# Patient Record
Sex: MALE | Race: BLACK OR AFRICAN AMERICAN | URBAN - METROPOLITAN AREA
[De-identification: names, ages, dates, MRNs, and addresses within clinical notes are randomized per-mention and may not be internally consistent; named-entity substitution may affect disease eponyms.]

---

## 2018-04-12 ENCOUNTER — HISTORICAL (OUTPATIENT)
Dept: INTERNAL MEDICINE | Facility: CLINIC | Age: 38
End: 2018-04-12

## 2018-10-02 ENCOUNTER — HISTORICAL (OUTPATIENT)
Dept: LAB | Facility: HOSPITAL | Age: 38
End: 2018-10-02

## 2019-02-22 ENCOUNTER — HISTORICAL (OUTPATIENT)
Dept: ENDOSCOPY | Facility: HOSPITAL | Age: 39
End: 2019-02-22

## 2021-08-27 ENCOUNTER — HISTORICAL (OUTPATIENT)
Dept: LAB | Facility: HOSPITAL | Age: 41
End: 2021-08-27

## 2022-02-23 ENCOUNTER — HISTORICAL (OUTPATIENT)
Dept: ADMINISTRATIVE | Facility: HOSPITAL | Age: 42
End: 2022-02-23

## 2022-02-27 ENCOUNTER — HISTORICAL (OUTPATIENT)
Dept: ADMINISTRATIVE | Facility: HOSPITAL | Age: 42
End: 2022-02-27

## 2022-04-09 ENCOUNTER — HISTORICAL (OUTPATIENT)
Dept: ADMINISTRATIVE | Facility: HOSPITAL | Age: 42
End: 2022-04-09

## 2022-04-19 ENCOUNTER — HISTORICAL (OUTPATIENT)
Dept: ADMINISTRATIVE | Facility: HOSPITAL | Age: 42
End: 2022-04-19

## 2022-04-26 VITALS
SYSTOLIC BLOOD PRESSURE: 139 MMHG | DIASTOLIC BLOOD PRESSURE: 89 MMHG | HEIGHT: 74 IN | BODY MASS INDEX: 40.43 KG/M2 | WEIGHT: 315 LBS

## 2022-04-30 NOTE — OP NOTE
* Final Report *    UHDS (Verified)  DISCHARGE DATE:      PREOPERATIVE DIAGNOSIS:  Positive fecal immunochemical test, rectal bleeding.    POSTOPERATIVE DIAGNOSIS:  Negative colonoscopy.    PROCEDURE:  Colonoscopy.    COMPLICATIONS:  None.    ESTIMATED BLOOD LOSS:  None.    FINDINGS:  Completely negative colonoscopy.  Re-scope when the patient turns 50.    PROCEDURE:  After the risks and benefits of the procedure were explained to the patient, informed consent was obtained.  He was taken to endoscopy on 02/22/2019, placed on the endoscopy table in the left lateral decubitus position.  Over the course of the procedure, he was administered propofol by Anesthesiology.  I performed a rectal examination which was negative.  I then inserted the colonoscope all the way to the base of the cecum as evidenced by the appendiceal orifice and the ileocecal valve.  I centered the scope and significantly retracted it looking around in a 360-degree fashion.  There were no mucosal abnormalities.  No masses.  No polyps.  No AVMs.  No diverticula.  No evidence of inflammatory bowel disease.  I retroflexed in the rectum.  He has very mild internal hemorrhoidal disease.  This may be where the bleeding was coming from.  The scope was removed.  He tolerated the procedure well and was taken the recovery room in stable condition.    RECOMMENDATIONS:  Re-scope at age 50 for screening purposes.        ______________________________  MD SHORTY Felder/VINICIO  DD:  02/22/2019  Time:  09:35AM  DT:  02/22/2019  Time:  10:18AM  Job #:  [id number]       Result type: Discharge Summary  Result date: February 22, 2019 10:18 CST  Result status: Auth (Verified)  Result title: UHDS  Performed by: Dougie Baldwin MD on February 22, 2019 9:35 CST  Verified by: Dougie Baldwin MD on February 25, 2019 17:08 CST  Encounter info: [id number], [facility], Day Surgery, 2/22/2019 - 2/22/2019  Contributor system: NUVETA    Doc  has been moved by HIM specialist to the correct doc type.

## 2022-09-02 ENCOUNTER — LAB VISIT (OUTPATIENT)
Dept: LAB | Facility: HOSPITAL | Age: 42
End: 2022-09-02
Attending: PSYCHIATRY & NEUROLOGY
Payer: MEDICAID

## 2022-09-02 DIAGNOSIS — F25.0 SCHIZOAFFECTIVE DISORDER, BIPOLAR TYPE: ICD-10-CM

## 2022-09-02 DIAGNOSIS — Z79.899 ENCOUNTER FOR LONG-TERM (CURRENT) USE OF OTHER MEDICATIONS: Primary | ICD-10-CM

## 2022-09-02 LAB
ALBUMIN SERPL-MCNC: 4.1 GM/DL (ref 3.5–5)
ALBUMIN/GLOB SERPL: 1.1 RATIO (ref 1.1–2)
ALP SERPL-CCNC: 90 UNIT/L (ref 40–150)
ALT SERPL-CCNC: 18 UNIT/L (ref 0–55)
AST SERPL-CCNC: 13 UNIT/L (ref 5–34)
BILIRUBIN DIRECT+TOT PNL SERPL-MCNC: 0.4 MG/DL
BUN SERPL-MCNC: 13.7 MG/DL (ref 8.9–20.6)
CALCIUM SERPL-MCNC: 9.1 MG/DL (ref 8.4–10.2)
CHLORIDE SERPL-SCNC: 109 MMOL/L (ref 98–107)
CHOLEST SERPL-MCNC: 154 MG/DL
CHOLEST/HDLC SERPL: 4 {RATIO} (ref 0–5)
CO2 SERPL-SCNC: 24 MMOL/L (ref 22–29)
CREAT SERPL-MCNC: 1.25 MG/DL (ref 0.73–1.18)
GFR SERPLBLD CREATININE-BSD FMLA CKD-EPI: >60 MLS/MIN/1.73/M2
GLOBULIN SER-MCNC: 3.7 GM/DL (ref 2.4–3.5)
GLUCOSE SERPL-MCNC: 90 MG/DL (ref 74–100)
HDLC SERPL-MCNC: 37 MG/DL (ref 35–60)
LDLC SERPL CALC-MCNC: 99 MG/DL (ref 50–140)
POTASSIUM SERPL-SCNC: 4.1 MMOL/L (ref 3.5–5.1)
PROT SERPL-MCNC: 7.8 GM/DL (ref 6.4–8.3)
SODIUM SERPL-SCNC: 140 MMOL/L (ref 136–145)
TRIGL SERPL-MCNC: 89 MG/DL (ref 34–140)
VLDLC SERPL CALC-MCNC: 18 MG/DL

## 2022-09-02 PROCEDURE — 80061 LIPID PANEL: CPT

## 2022-09-02 PROCEDURE — 80053 COMPREHEN METABOLIC PANEL: CPT

## 2022-09-02 PROCEDURE — 36415 COLL VENOUS BLD VENIPUNCTURE: CPT

## 2022-12-12 ENCOUNTER — HOSPITAL ENCOUNTER (EMERGENCY)
Facility: HOSPITAL | Age: 42
Discharge: HOME | End: 2022-12-12
Attending: INTERNAL MEDICINE
Payer: MEDICAID

## 2022-12-12 VITALS
TEMPERATURE: 99 F | HEART RATE: 87 BPM | WEIGHT: 315 LBS | OXYGEN SATURATION: 98 % | SYSTOLIC BLOOD PRESSURE: 198 MMHG | RESPIRATION RATE: 18 BRPM | DIASTOLIC BLOOD PRESSURE: 152 MMHG

## 2022-12-12 DIAGNOSIS — I10 HYPERTENSION, UNSPECIFIED TYPE: Primary | ICD-10-CM

## 2022-12-12 DIAGNOSIS — Z76.0 ENCOUNTER FOR MEDICATION REFILL: ICD-10-CM

## 2022-12-12 PROCEDURE — 99282 EMERGENCY DEPT VISIT SF MDM: CPT

## 2022-12-12 RX ORDER — AMLODIPINE BESYLATE 10 MG/1
10 TABLET ORAL DAILY
Qty: 30 TABLET | Refills: 1 | Status: SHIPPED | OUTPATIENT
Start: 2022-12-12 | End: 2023-06-06 | Stop reason: SDUPTHER

## 2022-12-12 NOTE — DISCHARGE INSTRUCTIONS
Follow up with [facility] as discussed to obtain a PCP.  Take medication as prescribed.  Return to the [facility] ED immediately for onset of chest pain, SOB, or fever.

## 2022-12-12 NOTE — ED PROVIDER NOTES
Encounter Date: 12/12/2022       History     Chief Complaint   Patient presents with    Hypertension     Pt reports HTN, sporadically taking BP meds, now out, needs refill.      Pt is a 42 y.o. male who presents to the [facility] ED requesting a refill of his blood pressure medication. Reports being low on medication for months and has only taken his medication sporadically so he doesn't run out. Denies headache, dizziness, blurred vision, chest pain, SOB, weakness, or abdominal pain. Pt did not bring his medication and is unable to verbalize the medication or dosing.     Review of patient's allergies indicates:  No Known Allergies  No past medical history on file.  No past surgical history on file.  No family history on file.     Review of Systems   Constitutional:  Negative for chills, diaphoresis, fatigue and fever.   HENT:  Negative for facial swelling, postnasal drip, rhinorrhea, sinus pressure, sinus pain, sore throat and trouble swallowing.    Respiratory:  Negative for cough, chest tightness, shortness of breath and wheezing.    Cardiovascular:  Negative for chest pain, palpitations and leg swelling.   Gastrointestinal:  Negative for abdominal pain, diarrhea, nausea and vomiting.   Genitourinary:  Negative for dysuria, flank pain, hematuria and urgency.   Musculoskeletal:  Negative for arthralgias, back pain and myalgias.   Skin:  Negative for color change and rash.   Neurological:  Negative for dizziness, syncope, weakness and headaches.   Hematological:  Does not bruise/bleed easily.   All other systems reviewed and are negative.    Physical Exam     Initial Vitals [12/12/22 1130]   BP Pulse Resp Temp SpO2   (!) 198/152 87 18 98.8 °F (37.1 °C) 98 %      MAP       --         Physical Exam    Nursing note and vitals reviewed.  Constitutional: Vital signs are normal. He appears well-developed and well-nourished.   HENT:   Head: Normocephalic.   Nose: Nose normal.   Mouth/Throat: Oropharynx is clear and moist.    Eyes: Conjunctivae and EOM are normal. Pupils are equal, round, and reactive to light.   Neck: Neck supple.   Normal range of motion.  Cardiovascular:  Normal rate, regular rhythm, normal heart sounds and intact distal pulses.           Pulmonary/Chest: Effort normal and breath sounds normal. No respiratory distress. He has no wheezes. He has no rhonchi. He has no rales. He exhibits no tenderness.   Abdominal: Abdomen is soft and flat. Bowel sounds are normal. There is no abdominal tenderness. There is no rebound, no guarding, no tenderness at McBurney's point and negative Lopez's sign.   Musculoskeletal:         General: Normal range of motion.      Cervical back: Normal range of motion and neck supple.     Neurological: He is alert and oriented to person, place, and time. He has normal strength.   Skin: Skin is warm and dry. Capillary refill takes less than 2 seconds.   Psychiatric: He has a normal mood and affect. His behavior is normal. Judgment and thought content normal.       ED Course   Procedures  Labs Reviewed - No data to display       Imaging Results    None          Medications - No data to display  Medical Decision Making:   Differential Diagnosis:   HTN  Noncompliant with medication regimen  ED Management:  12:02 PM Reassessed patient at this time. Reports condition has improved. Discussed with patient all pertinent ED information and results. Discussed diagnosis and treatment plan with patient. Follow up instructions and return to ED instruction have been given. All questions and concerns were addressed at this time. Patient voices understanding of information and instructions. Patient is comfortable with plan and discharge. Patient is stable for discharge.                           Clinical Impression:   Final diagnoses:  [I10] Hypertension, unspecified type (Primary)  [Z76.0] Encounter for medication refill        ED Disposition Condition    Discharge Stable          ED Prescriptions        Medication Sig Dispense Start Date End Date Auth. Provider    amLODIPine (NORVASC) 10 MG tablet Take 1 tablet (10 mg total) by mouth once daily. 30 tablet 12/12/2022 12/12/2023 Rivera Real Jr., BOUBACAR          Follow-up Information       Follow up With Specialties Details Why Contact Info    [facility] Emergency Medicine In 3 days As needed, If symptoms worsen [address] [place] [id number] [phone]    [facility]  Schedule an appointment as soon as possible for a visit in 1 week Follow up with [facility] to obtain a PCP [address] [place] [phone]             Rivera Real Jr., BOUBACAR  12/12/22 9100

## 2023-06-06 ENCOUNTER — HOSPITAL ENCOUNTER (EMERGENCY)
Facility: HOSPITAL | Age: 43
Discharge: HOME | End: 2023-06-06
Attending: EMERGENCY MEDICINE
Payer: MEDICAID

## 2023-06-06 VITALS
TEMPERATURE: 100 F | DIASTOLIC BLOOD PRESSURE: 117 MMHG | BODY MASS INDEX: 42.66 KG/M2 | RESPIRATION RATE: 16 BRPM | OXYGEN SATURATION: 96 % | WEIGHT: 315 LBS | HEART RATE: 69 BPM | SYSTOLIC BLOOD PRESSURE: 164 MMHG | HEIGHT: 72 IN

## 2023-06-06 DIAGNOSIS — I10 HYPERTENSION: Primary | ICD-10-CM

## 2023-06-06 DIAGNOSIS — Z91.148 NONCOMPLIANCE WITH MEDICATION REGIMEN: ICD-10-CM

## 2023-06-06 DIAGNOSIS — Z76.0 ENCOUNTER FOR MEDICATION REFILL: ICD-10-CM

## 2023-06-06 LAB
ALBUMIN SERPL-MCNC: 4 G/DL (ref 3.5–5)
ALBUMIN/GLOB SERPL: 1.2 RATIO (ref 1.1–2)
ALP SERPL-CCNC: 97 UNIT/L (ref 40–150)
ALT SERPL-CCNC: 14 UNIT/L (ref 0–55)
APPEARANCE UR: CLEAR
AST SERPL-CCNC: 11 UNIT/L (ref 5–34)
BACTERIA #/AREA URNS AUTO: ABNORMAL /HPF
BASOPHILS # BLD AUTO: 0.03 X10(3)/MCL
BASOPHILS NFR BLD AUTO: 0.9 %
BILIRUB UR QL STRIP.AUTO: NEGATIVE MG/DL
BILIRUBIN DIRECT+TOT PNL SERPL-MCNC: 0.4 MG/DL
BUN SERPL-MCNC: 21.1 MG/DL (ref 8.9–20.6)
CALCIUM SERPL-MCNC: 9.1 MG/DL (ref 8.4–10.2)
CHLORIDE SERPL-SCNC: 112 MMOL/L (ref 98–107)
CO2 SERPL-SCNC: 22 MMOL/L (ref 22–29)
COLOR UR: YELLOW
CREAT SERPL-MCNC: 1.59 MG/DL (ref 0.73–1.18)
EOSINOPHIL # BLD AUTO: 0.04 X10(3)/MCL (ref 0–0.9)
EOSINOPHIL NFR BLD AUTO: 1.2 %
ERYTHROCYTE [DISTWIDTH] IN BLOOD BY AUTOMATED COUNT: 17.6 % (ref 11.5–17)
GFR SERPLBLD CREATININE-BSD FMLA CKD-EPI: 55 MLS/MIN/1.73/M2
GLOBULIN SER-MCNC: 3.4 GM/DL (ref 2.4–3.5)
GLUCOSE SERPL-MCNC: 93 MG/DL (ref 74–100)
GLUCOSE UR QL STRIP.AUTO: NORMAL MG/DL
HCT VFR BLD AUTO: 39 % (ref 42–52)
HGB BLD-MCNC: 11.9 G/DL (ref 14–18)
HYALINE CASTS #/AREA URNS LPF: ABNORMAL /LPF
IMM GRANULOCYTES # BLD AUTO: 0.01 X10(3)/MCL (ref 0–0.04)
IMM GRANULOCYTES NFR BLD AUTO: 0.3 %
KETONES UR QL STRIP.AUTO: ABNORMAL MG/DL
LEUKOCYTE ESTERASE UR QL STRIP.AUTO: NEGATIVE UNIT/L
LYMPHOCYTES # BLD AUTO: 1.15 X10(3)/MCL (ref 0.6–4.6)
LYMPHOCYTES NFR BLD AUTO: 33.4 %
MCH RBC QN AUTO: 21.6 PG (ref 27–31)
MCHC RBC AUTO-ENTMCNC: 30.5 G/DL (ref 33–36)
MCV RBC AUTO: 70.7 FL (ref 80–94)
MONOCYTES # BLD AUTO: 0.43 X10(3)/MCL (ref 0.1–1.3)
MONOCYTES NFR BLD AUTO: 12.5 %
MUCOUS THREADS URNS QL MICRO: ABNORMAL /LPF
NEUTROPHILS # BLD AUTO: 1.78 X10(3)/MCL (ref 2.1–9.2)
NEUTROPHILS NFR BLD AUTO: 51.7 %
NITRITE UR QL STRIP.AUTO: NEGATIVE
NRBC BLD AUTO-RTO: 0 %
PH UR STRIP.AUTO: 6 [PH]
PLATELET # BLD AUTO: 185 X10(3)/MCL (ref 130–400)
PLATELETS.RETICULATED NFR BLD AUTO: 9.2 % (ref 0.9–11.2)
PMV BLD AUTO: 0 FL (ref 7.4–10.4)
POTASSIUM SERPL-SCNC: 4.3 MMOL/L (ref 3.5–5.1)
PROT SERPL-MCNC: 7.4 GM/DL (ref 6.4–8.3)
PROT UR QL STRIP.AUTO: ABNORMAL MG/DL
RBC # BLD AUTO: 5.52 X10(6)/MCL (ref 4.7–6.1)
RBC #/AREA URNS AUTO: ABNORMAL /HPF
RBC UR QL AUTO: NEGATIVE UNIT/L
SODIUM SERPL-SCNC: 141 MMOL/L (ref 136–145)
SP GR UR STRIP.AUTO: 1.03
SQUAMOUS #/AREA URNS LPF: ABNORMAL /HPF
TROPONIN I SERPL-MCNC: 0.03 NG/ML (ref 0–0.04)
UROBILINOGEN UR STRIP-ACNC: ABNORMAL MG/DL
WBC # SPEC AUTO: 3.44 X10(3)/MCL (ref 4.5–11.5)
WBC #/AREA URNS AUTO: ABNORMAL /HPF

## 2023-06-06 PROCEDURE — 84484 ASSAY OF TROPONIN QUANT: CPT | Performed by: NURSE PRACTITIONER

## 2023-06-06 PROCEDURE — 85025 COMPLETE CBC W/AUTO DIFF WBC: CPT | Performed by: NURSE PRACTITIONER

## 2023-06-06 PROCEDURE — 80053 COMPREHEN METABOLIC PANEL: CPT | Performed by: NURSE PRACTITIONER

## 2023-06-06 PROCEDURE — 99285 EMERGENCY DEPT VISIT HI MDM: CPT | Mod: 25

## 2023-06-06 PROCEDURE — 81001 URINALYSIS AUTO W/SCOPE: CPT | Performed by: NURSE PRACTITIONER

## 2023-06-06 PROCEDURE — 93005 ELECTROCARDIOGRAM TRACING: CPT

## 2023-06-06 PROCEDURE — 25000003 PHARM REV CODE 250: Performed by: NURSE PRACTITIONER

## 2023-06-06 RX ORDER — LISINOPRIL 10 MG/1
10 TABLET ORAL DAILY
Qty: 30 TABLET | Refills: 0 | Status: SHIPPED | OUTPATIENT
Start: 2023-06-06 | End: 2023-07-19 | Stop reason: SDUPTHER

## 2023-06-06 RX ORDER — LISINOPRIL 10 MG/1
10 TABLET ORAL
Status: COMPLETED | OUTPATIENT
Start: 2023-06-06 | End: 2023-06-06

## 2023-06-06 RX ORDER — CLONIDINE HYDROCHLORIDE 0.1 MG/1
0.1 TABLET ORAL
Status: COMPLETED | OUTPATIENT
Start: 2023-06-06 | End: 2023-06-06

## 2023-06-06 RX ORDER — AMLODIPINE BESYLATE 5 MG/1
5 TABLET ORAL
Status: COMPLETED | OUTPATIENT
Start: 2023-06-06 | End: 2023-06-06

## 2023-06-06 RX ORDER — AMLODIPINE BESYLATE 10 MG/1
10 TABLET ORAL DAILY
Qty: 30 TABLET | Refills: 0 | Status: SHIPPED | OUTPATIENT
Start: 2023-06-06 | End: 2023-07-19 | Stop reason: SDUPTHER

## 2023-06-06 RX ADMIN — CLONIDINE HYDROCHLORIDE 0.1 MG: 0.1 TABLET ORAL at 09:06

## 2023-06-06 RX ADMIN — AMLODIPINE BESYLATE 5 MG: 5 TABLET ORAL at 09:06

## 2023-06-06 RX ADMIN — LISINOPRIL 10 MG: 10 TABLET ORAL at 09:06

## 2023-06-06 NOTE — ED PROVIDER NOTES
Encounter Date: 6/6/2023       History     Chief Complaint   Patient presents with    Medication Refill    Hypertension     PT REQUESTING MED REFILL FOR HTN.  OUT FOR WKS.  NO PCP. /123.  ASYMPTOMATIC.      Patient requests refill of blood pressure medication. He has been out for several months. He denies any chest pain or shortness of breath. He denies any other complaints at this time. His risk factors include obesity and hypertension.    Review of patient's allergies indicates:  No Known Allergies  Past Medical History:   Diagnosis Date    Hypertension      History reviewed. No pertinent surgical history.  History reviewed. No pertinent family history.  Social History     Tobacco Use    Smoking status: Some Days     Types: Cigarettes    Smokeless tobacco: Never   Substance Use Topics    Drug use: Not Currently     Review of Systems   Constitutional:  Negative for fever.   HENT:  Negative for sore throat.    Respiratory:  Negative for shortness of breath.    Cardiovascular:  Negative for chest pain.   Gastrointestinal:  Negative for nausea.   Genitourinary:  Negative for dysuria.   Musculoskeletal:  Negative for back pain.   Skin:  Negative for rash.   Neurological:  Negative for weakness.   Hematological:  Does not bruise/bleed easily.   All other systems reviewed and are negative.    Physical Exam     Initial Vitals [06/06/23 0946]   BP Pulse Resp Temp SpO2   (!) 180/123 82 16 99.5 °F (37.5 °C) 96 %      MAP       --         Physical Exam    Nursing note and vitals reviewed.  Constitutional: He appears well-developed and well-nourished.   HENT:   Head: Normocephalic and atraumatic.   Neck: Neck supple.   Normal range of motion.  Cardiovascular:  Normal rate, regular rhythm, normal heart sounds and intact distal pulses.           Pulmonary/Chest: Breath sounds normal.   Abdominal: Abdomen is soft. Bowel sounds are normal.   Musculoskeletal:         General: Normal range of motion.      Cervical back: Normal  range of motion and neck supple.     Neurological: He is alert and oriented to person, place, and time. He has normal strength.   Skin: Skin is warm and dry.   Psychiatric: He has a normal mood and affect.       ED Course   Procedures  Labs Reviewed   COMPREHENSIVE METABOLIC PANEL - Abnormal; Notable for the following components:       Result Value    Chloride 112 (*)     Blood Urea Nitrogen 21.1 (*)     Creatinine 1.59 (*)     All other components within normal limits   URINALYSIS, REFLEX TO URINE CULTURE - Abnormal; Notable for the following components:    Protein, UA 1+ (*)     Ketones, UA Trace (*)     Urobilinogen, UA 1+ (*)     Squamous Epithelial Cells, UA Trace (*)     Mucous, UA Trace (*)     Hyaline Casts, UA 0-2 (*)     All other components within normal limits   CBC WITH DIFFERENTIAL - Abnormal; Notable for the following components:    WBC 3.44 (*)     Hgb 11.9 (*)     Hct 39.0 (*)     MCV 70.7 (*)     MCH 21.6 (*)     MCHC 30.5 (*)     RDW 17.6 (*)     MPV 0.0 (*)     Neut # 1.78 (*)     All other components within normal limits   TROPONIN I - Normal   CBC W/ AUTO DIFFERENTIAL    Narrative:     The following orders were created for panel order CBC auto differential.  Procedure                               Abnormality         Status                     ---------                               -----------         ------                     CBC with Differential[[id number]]        Abnormal            Final result                 Please view results for these tests on the individual orders.     EKG Readings: (Independently Interpreted)   Initial Reading: No STEMI. Rhythm: Normal Sinus Rhythm. Heart Rate: 77. Ectopy: No Ectopy. Conduction: Normal. Axis: Left Axis Deviation. Other Impression: nonspecific st abnormality   ECG Results              EKG 12-lead (In process)  Result time 06/06/23 10:45:15      In process by Interface, [facility] (06/06/23 10:45:15)                   Narrative:    Test Reason :  I10,    Vent. Rate : 077 BPM     Atrial Rate : 077 BPM     P-R Int : 152 ms          QRS Dur : 088 ms      QT Int : 386 ms       P-R-T Axes : 045 -44 014 degrees     QTc Int : 436 ms    Normal sinus rhythm  Left axis deviation  Junctional ST depression, probably abnormal  Abnormal ECG  No previous ECGs available    Referred By: [email or web address]   SELF           Confirmed By:                                   Imaging Results              X-Ray Chest AP Portable (Final result)  Result time 06/06/23 10:36:10      Final result by Rivera Garrett MD (06/06/23 10:36:10)                   Impression:      No acute findings.      Electronically signed by: Rivera Garrett  Date:    06/06/2023  Time:    10:36               Narrative:    EXAMINATION:  XR CHEST AP PORTABLE    CLINICAL HISTORY:  Essential (primary) hypertension    COMPARISON:  No priors    FINDINGS:  Portable frontal view of the chest was obtained. The heart is not enlarged.  Lungs are clear.  There is no pneumothorax or significant effusion.                                       Medications   cloNIDine tablet 0.1 mg (0.1 mg Oral Given 6/6/23 0957)   amLODIPine tablet 5 mg (5 mg Oral Given 6/6/23 0957)   lisinopriL tablet 10 mg (10 mg Oral Given 6/6/23 0957)     Medical Decision Making:   History:   Old Records Summarized: records from clinic visits and records from previous admission(s).  Initial Assessment:   Patient requests refill of blood pressure medication. He has been out for several months. He denies any chest pain or shortness of breath. He denies any other complaints at this time. His risk factors include obesity and hypertension.    Clinical Tests:   Lab Tests: Ordered and Reviewed  Radiological Study: Ordered and Reviewed  Re-eval at 1150: no complaints while in the ER, will add lisinopril to currently prescribed amlodipine, he was advised to take all medications as prescribed,  he will follow up with his pcp, strict return to ER instructions given    11:56  AM DISPOSITION: The patient is resting comfortably in no acute distress.  He is hemodynamically stable and is without objective evidence for acute process requiring urgent intervention or hospitalization. I provided counseling to patient with regard to condition, the treatment plan, specific conditions for return, and the importance of follow up. Detailed written and verbal instructions provided to patient and he expressed a verbal understanding of the discharge instructions and overall management plan. Reiterated the importance of medication administration and safety and advised patient to follow up with primary care provider in 3-5 days or sooner if needed.  Answered questions at this time. The patient is stable for discharge.                           Clinical Impression:   Final diagnoses:  [I10] Hypertension (Primary)  [Z76.0] Encounter for medication refill  [Z91.148] Noncompliance with medication regimen        ED Disposition Condition    Discharge Stable          ED Prescriptions       Medication Sig Dispense Start Date End Date Auth. Provider    amLODIPine (NORVASC) 10 MG tablet Take 1 tablet (10 mg total) by mouth once daily. 30 tablet 6/6/2023 7/6/2023 YOLA Harvey    lisinopriL 10 MG tablet Take 1 tablet (10 mg total) by mouth once daily. 30 tablet 6/6/2023 7/6/2023 YOLA Harvey          Follow-up Information       Follow up With Specialties Details Why Contact Info    Christian Lagunas MD Psychiatry In 3 days  [address] [facility] [address]  [phone]      [facility] Emergency Medicine  If symptoms worsen [address] [facility] [id number] [phone]             YOLA Harvey  06/06/23 9595

## 2023-07-19 ENCOUNTER — OFFICE VISIT (OUTPATIENT)
Dept: INTERNAL MEDICINE | Facility: CLINIC | Age: 43
End: 2023-07-19
Payer: MEDICAID

## 2023-07-19 VITALS
HEART RATE: 81 BPM | DIASTOLIC BLOOD PRESSURE: 110 MMHG | BODY MASS INDEX: 42.66 KG/M2 | RESPIRATION RATE: 16 BRPM | TEMPERATURE: 98 F | WEIGHT: 315 LBS | SYSTOLIC BLOOD PRESSURE: 158 MMHG | HEIGHT: 72 IN

## 2023-07-19 DIAGNOSIS — A64 STD (SEXUALLY TRANSMITTED DISEASE): ICD-10-CM

## 2023-07-19 DIAGNOSIS — J30.9 ALLERGIC RHINITIS, UNSPECIFIED SEASONALITY, UNSPECIFIED TRIGGER: ICD-10-CM

## 2023-07-19 DIAGNOSIS — F17.210 CIGARETTE NICOTINE DEPENDENCE WITHOUT COMPLICATION: ICD-10-CM

## 2023-07-19 DIAGNOSIS — I10 HYPERTENSION, UNSPECIFIED TYPE: ICD-10-CM

## 2023-07-19 DIAGNOSIS — Z00.00 WELLNESS EXAMINATION: ICD-10-CM

## 2023-07-19 LAB
APPEARANCE UR: CLEAR
BACTERIA #/AREA URNS AUTO: ABNORMAL /HPF
BILIRUB UR QL STRIP.AUTO: NEGATIVE
COLOR UR: ABNORMAL
GLUCOSE UR QL STRIP.AUTO: NORMAL
HYALINE CASTS #/AREA URNS LPF: ABNORMAL /LPF
KETONES UR QL STRIP.AUTO: NEGATIVE
LEUKOCYTE ESTERASE UR QL STRIP.AUTO: NEGATIVE
MUCOUS THREADS URNS QL MICRO: ABNORMAL /LPF
NITRITE UR QL STRIP.AUTO: NEGATIVE
PH UR STRIP.AUTO: 6 [PH]
PROT UR QL STRIP.AUTO: ABNORMAL
RBC #/AREA URNS AUTO: ABNORMAL /HPF
RBC UR QL AUTO: NEGATIVE
SP GR UR STRIP.AUTO: 1.03
SQUAMOUS #/AREA URNS LPF: ABNORMAL /HPF
UROBILINOGEN UR STRIP-ACNC: NORMAL
WBC #/AREA URNS AUTO: ABNORMAL /HPF

## 2023-07-19 PROCEDURE — 1160F RVW MEDS BY RX/DR IN RCRD: CPT | Mod: CPTII,,, | Performed by: NURSE PRACTITIONER

## 2023-07-19 PROCEDURE — 3080F DIAST BP >= 90 MM HG: CPT | Mod: CPTII,,, | Performed by: NURSE PRACTITIONER

## 2023-07-19 PROCEDURE — 1160F PR REVIEW ALL MEDS BY PRESCRIBER/CLIN PHARMACIST DOCUMENTED: ICD-10-PCS | Mod: CPTII,,, | Performed by: NURSE PRACTITIONER

## 2023-07-19 PROCEDURE — 99204 PR OFFICE/OUTPT VISIT, NEW, LEVL IV, 45-59 MIN: ICD-10-PCS | Mod: S$PBB,25,, | Performed by: NURSE PRACTITIONER

## 2023-07-19 PROCEDURE — 99406 PR TOBACCO USE CESSATION INTERMEDIATE 3-10 MINUTES: ICD-10-PCS | Mod: S$PBB,,, | Performed by: NURSE PRACTITIONER

## 2023-07-19 PROCEDURE — 3080F PR MOST RECENT DIASTOLIC BLOOD PRESSURE >= 90 MM HG: ICD-10-PCS | Mod: CPTII,,, | Performed by: NURSE PRACTITIONER

## 2023-07-19 PROCEDURE — 99215 OFFICE O/P EST HI 40 MIN: CPT | Mod: PBBFAC | Performed by: NURSE PRACTITIONER

## 2023-07-19 PROCEDURE — 4010F ACE/ARB THERAPY RXD/TAKEN: CPT | Mod: CPTII,,, | Performed by: NURSE PRACTITIONER

## 2023-07-19 PROCEDURE — 1159F MED LIST DOCD IN RCRD: CPT | Mod: CPTII,,, | Performed by: NURSE PRACTITIONER

## 2023-07-19 PROCEDURE — 3077F SYST BP >= 140 MM HG: CPT | Mod: CPTII,,, | Performed by: NURSE PRACTITIONER

## 2023-07-19 PROCEDURE — 4010F PR ACE/ARB THEARPY RXD/TAKEN: ICD-10-PCS | Mod: CPTII,,, | Performed by: NURSE PRACTITIONER

## 2023-07-19 PROCEDURE — 99204 OFFICE O/P NEW MOD 45 MIN: CPT | Mod: S$PBB,25,, | Performed by: NURSE PRACTITIONER

## 2023-07-19 PROCEDURE — 3044F HG A1C LEVEL LT 7.0%: CPT | Mod: CPTII,,, | Performed by: NURSE PRACTITIONER

## 2023-07-19 PROCEDURE — 99406 BEHAV CHNG SMOKING 3-10 MIN: CPT | Mod: S$PBB,,, | Performed by: NURSE PRACTITIONER

## 2023-07-19 PROCEDURE — 3008F PR BODY MASS INDEX (BMI) DOCUMENTED: ICD-10-PCS | Mod: CPTII,,, | Performed by: NURSE PRACTITIONER

## 2023-07-19 PROCEDURE — 1159F PR MEDICATION LIST DOCUMENTED IN MEDICAL RECORD: ICD-10-PCS | Mod: CPTII,,, | Performed by: NURSE PRACTITIONER

## 2023-07-19 PROCEDURE — 3044F PR MOST RECENT HEMOGLOBIN A1C LEVEL <7.0%: ICD-10-PCS | Mod: CPTII,,, | Performed by: NURSE PRACTITIONER

## 2023-07-19 PROCEDURE — 3077F PR MOST RECENT SYSTOLIC BLOOD PRESSURE >= 140 MM HG: ICD-10-PCS | Mod: CPTII,,, | Performed by: NURSE PRACTITIONER

## 2023-07-19 PROCEDURE — 81001 URINALYSIS AUTO W/SCOPE: CPT | Performed by: NURSE PRACTITIONER

## 2023-07-19 PROCEDURE — 3008F BODY MASS INDEX DOCD: CPT | Mod: CPTII,,, | Performed by: NURSE PRACTITIONER

## 2023-07-19 RX ORDER — PALIPERIDONE PALMITATE 156 MG/ML
INJECTION INTRAMUSCULAR
COMMUNITY
Start: 2023-07-05

## 2023-07-19 RX ORDER — AMLODIPINE BESYLATE 10 MG/1
10 TABLET ORAL DAILY
Qty: 30 TABLET | Refills: 1 | Status: SHIPPED | OUTPATIENT
Start: 2023-07-19 | End: 2023-08-16 | Stop reason: SDUPTHER

## 2023-07-19 RX ORDER — LISINOPRIL 10 MG/1
10 TABLET ORAL DAILY
Qty: 30 TABLET | Refills: 1 | Status: SHIPPED | OUTPATIENT
Start: 2023-07-19 | End: 2023-08-16 | Stop reason: SDUPTHER

## 2023-07-19 NOTE — PROGRESS NOTES
Internal Medicine Clinic  [facility]OUBACAR [facility]     Patient Name: Santiago Liriano   : 1980  MRN:[id number]     Chief Complaint     Chief Complaint   Patient presents with    Establish Care     Hx of HTN        History of Present Illness     43 year old AAM, presents in clinic to establish PCP. Out BP meds for 2 wks. BP elevated today, denies symptoms. PMH HTN, Schizophrenia 2015, [place], obesity, tobacco use. Smokes 12 cigs/day or 2 cigars per day. Stopped smoking 2 wks prior. BMI 44.  Follows Dr. Lagunas Mental health provider every 3 months. Counselor is at [facility] and meets every 2-3 weeks. On Invega, mood stable at this time. Lives alone, unemployed. Sleeping well. Appetite fair. Counting calories at home, eats 3-6 small meals, and trying to exercise 4-8 miles per day by walking.  Denies chest pain, shortness of breath, cough, fever, headache, dizziness, weakness, abdominal pain, nausea, vomiting, diarrhea, constipation, dysuria, depression, anxiety.         Review of Systems     Review of Systems   Constitutional: Negative.    HENT: Negative.     Eyes: Negative.    Respiratory: Negative.     Cardiovascular: Negative.    Gastrointestinal: Negative.    Endocrine: Negative.    Genitourinary: Negative.    Musculoskeletal: Negative.    Integumentary:  Negative.   Allergic/Immunologic: Negative.    Neurological: Negative.    Hematological: Negative.    Psychiatric/Behavioral: Negative.     All other systems reviewed and are negative.     Physical Examination     Visit Vitals  BP (!) 158/110 (BP Location: Right arm, Patient Position: Sitting, BP Method: X-Large (Manual))   Pulse 81   Temp 98.4 °F (36.9 °C) (Oral)   Resp 16   Ht 6' (1.829 m)   Wt (!) 150.1 kg (331 lb)   BMI 44.89 kg/m²        BP Readings from Last 6 Encounters:   23 (!) 158/110   23 (!) 164/117   22 (!) 198/152   ]    Wt Readings from Last 6 Encounters:   23 (!) 150.1 kg (331 lb)   23 (!) 154 kg (339 lb 8.1 oz)    12/12/22 (!) 161 kg (355 lb)   ]      Physical Exam  Vitals and nursing note reviewed.   Constitutional:       Appearance: Normal appearance.   HENT:      Head: Normocephalic.      Right Ear: Tympanic membrane, ear canal and external ear normal.      Left Ear: Tympanic membrane, ear canal and external ear normal.      Nose: Nose normal.      Mouth/Throat:      Mouth: Mucous membranes are moist.      Pharynx: Oropharynx is clear.   Eyes:      Extraocular Movements: Extraocular movements intact.      Conjunctiva/sclera: Conjunctivae normal.      Pupils: Pupils are equal, round, and reactive to light.   Cardiovascular:      Rate and Rhythm: Normal rate and regular rhythm.      Pulses: Normal pulses.      Heart sounds: Normal heart sounds.   Pulmonary:      Effort: Pulmonary effort is normal.      Breath sounds: Normal breath sounds.   Abdominal:      General: Bowel sounds are normal.      Palpations: Abdomen is soft.   Musculoskeletal:         General: Normal range of motion.      Cervical back: Normal range of motion and neck supple.   Skin:     General: Skin is warm and dry.      Capillary Refill: Capillary refill takes less than 2 seconds.   Neurological:      General: No focal deficit present.      Mental Status: He is alert and oriented to person, place, and time. Mental status is at baseline.   Psychiatric:         Mood and Affect: Mood normal.         Behavior: Behavior normal.         Thought Content: Thought content normal.         Judgment: Judgment normal.        Labs / Imaging     Chemistry:  Lab Results   Component Value Date     07/19/2023    K 3.6 07/19/2023    CHLORIDE 112 (H) 07/19/2023    BUN 18.2 07/19/2023    CREATININE 1.73 (H) 07/19/2023    EGFRNORACEVR 50 07/19/2023    GLUCOSE 106 (H) 07/19/2023    CALCIUM 8.9 07/19/2023    ALKPHOS 79 07/19/2023    LABPROT 7.9 07/19/2023    ALBUMIN 4.2 07/19/2023    AST 15 07/19/2023    ALT 21 07/19/2023    AOQMWLSQ60RR 19.4 (L) 07/19/2023        Lab  Results   Component Value Date    HGBA1C 5.9 07/19/2023        Hematology:  Lab Results   Component Value Date    WBC 4.65 07/19/2023    RBC 5.28 07/19/2023    HGB 11.8 (L) 07/19/2023    HCT 38.4 (L) 07/19/2023    MCV 72.7 (L) 07/19/2023    MCH 22.3 (L) 07/19/2023    MCHC 30.7 (L) 07/19/2023    RDW 18.9 (H) 07/19/2023     07/19/2023    MPV  07/19/2023      Comment:      N/A        Lipid Panel:  Lab Results   Component Value Date    CHOL 175 07/19/2023    HDL 39 07/19/2023    .00 07/19/2023    TRIG 156 (H) 07/19/2023    TOTALCHOLEST 4 07/19/2023        Urine:  Lab Results   Component Value Date    COLORUA Light-Yellow 07/19/2023    APPEARANCEUA Clear 07/19/2023    SGUA 1.028 07/19/2023    PHUA 6.0 07/19/2023    PROTEINUA 1+ (A) 07/19/2023    GLUCOSEUA Normal 07/19/2023    KETONESUA Negative 07/19/2023    BLOODUA Negative 07/19/2023    NITRITESUA Negative 07/19/2023    LEUKOCYTESUR Negative 07/19/2023    RBCUA 0-5 07/19/2023    WBCUA 0-5 07/19/2023    BACTERIA Trace (A) 07/19/2023    SQEPUA Trace (A) 07/19/2023    HYALINECASTS None Seen 07/19/2023          Assessment       ICD-10-CM ICD-9-CM   1. Hypertension, unspecified type  I10 401.9   2. Cigarette nicotine dependence without complication [F17.210 (ICD-10-CM)]  F17.210 305.1   3. Wellness examination  Z00.00 V70.0   4. STD (sexually transmitted disease)  A64 099.9   5. Allergic rhinitis, unspecified seasonality, unspecified trigger  J30.9 477.9        Plan   1. Hypertension, unspecified type  BP elevated. HR stable. Med refills. DASH diet: Eat more fruits, vegetables, and low fat dairy foods.  (Less than 2 grams of sodium per day).  Maintain healthy weight with goal BMI <30.   Exercise 30 minutes per day 5 days per week.  Home medications refilled and continued.   Home BP monitoring encouraged with BP parameters given.    - amLODIPine (NORVASC) 10 MG tablet; Take 1 tablet (10 mg total) by mouth once daily.  Dispense: 30 tablet; Refill: 1  -  lisinopriL 10 MG tablet; Take 1 tablet (10 mg total) by mouth once daily.  Dispense: 30 tablet; Refill: 1    2. Cigarette nicotine dependence without complication [F17.210 (ICD-10-CM)]  Smoking cessation advised. Instructed on smoking cessation program through [facility] and pharmacological interventions to aid in cessation.  >5 minutes allotted to educate patient on the harms of smoking, the urgency to quit, and the development of a plan for smoking cessation.   - Ambulatory referral/consult to Smoking Cessation Program; Future    3. Wellness examination    - CBC Auto Differential; Future  - Comprehensive Metabolic Panel; Future  - Lipid Panel; Future  - TSH; Future  - Hemoglobin A1C; Future  - Urinalysis; Future  - Vitamin D; Future  - HIV 1/2 Ag/Ab (4th Gen); Future  - SYPHILIS ANTIBODY (WITH REFLEX RPR); Future  - Hepatitis Panel, Acute; Future  - Urinalysis    4. STD (sexually transmitted disease)    - Urinalysis; Future  - HIV 1/2 Ag/Ab (4th Gen); Future  - SYPHILIS ANTIBODY (WITH REFLEX RPR); Future  - Hepatitis Panel, Acute; Future  - Urinalysis    5. Allergic rhinitis, unspecified seasonality, unspecified trigger  OTC loratadine and flonase  Avoid/limit triggers such as pollen, dust, molds and animal dander.  Avoid smoke, strong chemicals, and strong cleaning products in addition to strong perfumes/colognes.  Report Upper Respiratory Infection in early stages and seek treatment.         Current Outpatient Medications   Medication Instructions    amLODIPine (NORVASC) 10 mg, Oral, Daily    INVEGA SUSTENNA 156 mg/mL Syrg injection Intramuscular    lisinopriL 10 mg, Oral, Daily       Orders Placed This Encounter   Procedures    CBC Auto Differential    Comprehensive Metabolic Panel    Lipid Panel    TSH    Hemoglobin A1C    Urinalysis    Vitamin D    HIV 1/2 Ag/Ab (4th Gen)    SYPHILIS ANTIBODY (WITH REFLEX RPR)    Hepatitis Panel, Acute    Ambulatory referral/consult to Smoking Cessation Program         Future  Appointments   Date Time Provider Department Center   8/16/2023  8:00 AM BOUBACAR [facility]        Follow up in about 4 weeks (around 8/16/2023) for lab review.    Labs thoroughly reviewed with patient. Medication refills addressed today.  RTC prn and 4 wks BP check, with labs 1 week prior to the apt.  COVID 19 precautions given to patient.  Patient voices understanding of all discharge instructions.      BOUBACAR [place]

## 2023-08-16 ENCOUNTER — PATIENT MESSAGE (OUTPATIENT)
Dept: INTERNAL MEDICINE | Facility: CLINIC | Age: 43
End: 2023-08-16

## 2023-08-16 ENCOUNTER — OFFICE VISIT (OUTPATIENT)
Dept: INTERNAL MEDICINE | Facility: CLINIC | Age: 43
End: 2023-08-16
Payer: MEDICAID

## 2023-08-16 VITALS
HEART RATE: 86 BPM | BODY MASS INDEX: 42.66 KG/M2 | SYSTOLIC BLOOD PRESSURE: 122 MMHG | DIASTOLIC BLOOD PRESSURE: 80 MMHG | WEIGHT: 315 LBS | HEIGHT: 72 IN | TEMPERATURE: 99 F | RESPIRATION RATE: 16 BRPM

## 2023-08-16 DIAGNOSIS — I10 HYPERTENSION, UNSPECIFIED TYPE: ICD-10-CM

## 2023-08-16 DIAGNOSIS — R73.03 PREDIABETES: ICD-10-CM

## 2023-08-16 DIAGNOSIS — F17.210 CIGARETTE NICOTINE DEPENDENCE WITHOUT COMPLICATION: ICD-10-CM

## 2023-08-16 DIAGNOSIS — Z00.00 WELLNESS EXAMINATION: ICD-10-CM

## 2023-08-16 DIAGNOSIS — E78.5 HYPERLIPIDEMIA, UNSPECIFIED HYPERLIPIDEMIA TYPE: ICD-10-CM

## 2023-08-16 DIAGNOSIS — D64.9 ANEMIA, UNSPECIFIED TYPE: ICD-10-CM

## 2023-08-16 DIAGNOSIS — E66.01 CLASS 3 SEVERE OBESITY WITH BODY MASS INDEX (BMI) OF 40.0 TO 44.9 IN ADULT, UNSPECIFIED OBESITY TYPE, UNSPECIFIED WHETHER SERIOUS COMORBIDITY PRESENT: ICD-10-CM

## 2023-08-16 DIAGNOSIS — E55.9 VITAMIN D DEFICIENCY: ICD-10-CM

## 2023-08-16 PROCEDURE — 99396 PR PREVENTIVE VISIT,EST,40-64: ICD-10-PCS | Mod: S$PBB,,, | Performed by: NURSE PRACTITIONER

## 2023-08-16 PROCEDURE — 3044F HG A1C LEVEL LT 7.0%: CPT | Mod: CPTII,,, | Performed by: NURSE PRACTITIONER

## 2023-08-16 PROCEDURE — 4010F ACE/ARB THERAPY RXD/TAKEN: CPT | Mod: CPTII,,, | Performed by: NURSE PRACTITIONER

## 2023-08-16 PROCEDURE — 3074F SYST BP LT 130 MM HG: CPT | Mod: CPTII,,, | Performed by: NURSE PRACTITIONER

## 2023-08-16 PROCEDURE — 99406 BEHAV CHNG SMOKING 3-10 MIN: CPT | Mod: S$PBB,,, | Performed by: NURSE PRACTITIONER

## 2023-08-16 PROCEDURE — 3074F PR MOST RECENT SYSTOLIC BLOOD PRESSURE < 130 MM HG: ICD-10-PCS | Mod: CPTII,,, | Performed by: NURSE PRACTITIONER

## 2023-08-16 PROCEDURE — 3008F BODY MASS INDEX DOCD: CPT | Mod: CPTII,,, | Performed by: NURSE PRACTITIONER

## 2023-08-16 PROCEDURE — 99406 PR TOBACCO USE CESSATION INTERMEDIATE 3-10 MINUTES: ICD-10-PCS | Mod: S$PBB,,, | Performed by: NURSE PRACTITIONER

## 2023-08-16 PROCEDURE — 3008F PR BODY MASS INDEX (BMI) DOCUMENTED: ICD-10-PCS | Mod: CPTII,,, | Performed by: NURSE PRACTITIONER

## 2023-08-16 PROCEDURE — 3079F PR MOST RECENT DIASTOLIC BLOOD PRESSURE 80-89 MM HG: ICD-10-PCS | Mod: CPTII,,, | Performed by: NURSE PRACTITIONER

## 2023-08-16 PROCEDURE — 3044F PR MOST RECENT HEMOGLOBIN A1C LEVEL <7.0%: ICD-10-PCS | Mod: CPTII,,, | Performed by: NURSE PRACTITIONER

## 2023-08-16 PROCEDURE — 1159F PR MEDICATION LIST DOCUMENTED IN MEDICAL RECORD: ICD-10-PCS | Mod: CPTII,,, | Performed by: NURSE PRACTITIONER

## 2023-08-16 PROCEDURE — 4010F PR ACE/ARB THEARPY RXD/TAKEN: ICD-10-PCS | Mod: CPTII,,, | Performed by: NURSE PRACTITIONER

## 2023-08-16 PROCEDURE — 99396 PREV VISIT EST AGE 40-64: CPT | Mod: S$PBB,,, | Performed by: NURSE PRACTITIONER

## 2023-08-16 PROCEDURE — 1159F MED LIST DOCD IN RCRD: CPT | Mod: CPTII,,, | Performed by: NURSE PRACTITIONER

## 2023-08-16 PROCEDURE — 99214 OFFICE O/P EST MOD 30 MIN: CPT | Mod: PBBFAC | Performed by: NURSE PRACTITIONER

## 2023-08-16 PROCEDURE — 3079F DIAST BP 80-89 MM HG: CPT | Mod: CPTII,,, | Performed by: NURSE PRACTITIONER

## 2023-08-16 RX ORDER — LISINOPRIL 10 MG/1
10 TABLET ORAL DAILY
Qty: 90 TABLET | Refills: 1 | Status: SHIPPED | OUTPATIENT
Start: 2023-08-16 | End: 2023-12-18 | Stop reason: SDUPTHER

## 2023-08-16 RX ORDER — ASPIRIN 325 MG
50000 TABLET, DELAYED RELEASE (ENTERIC COATED) ORAL
Qty: 12 CAPSULE | Refills: 0 | Status: SHIPPED | OUTPATIENT
Start: 2023-08-16 | End: 2023-12-18

## 2023-08-16 RX ORDER — ACETAMINOPHEN 500 MG
2000 TABLET ORAL DAILY
Qty: 90 CAPSULE | Refills: 1 | Status: SHIPPED | OUTPATIENT
Start: 2023-08-16

## 2023-08-16 RX ORDER — DOCUSATE SODIUM 100 MG/1
100 CAPSULE, LIQUID FILLED ORAL 2 TIMES DAILY PRN
Qty: 60 CAPSULE | Refills: 0 | Status: SHIPPED | OUTPATIENT
Start: 2023-08-16

## 2023-08-16 RX ORDER — FERROUS SULFATE 324(65)MG
324 TABLET, DELAYED RELEASE (ENTERIC COATED) ORAL EVERY OTHER DAY
Qty: 45 TABLET | Refills: 1 | Status: SHIPPED | OUTPATIENT
Start: 2023-08-16 | End: 2023-12-18 | Stop reason: SDUPTHER

## 2023-08-16 RX ORDER — AMLODIPINE BESYLATE 10 MG/1
10 TABLET ORAL DAILY
Qty: 90 TABLET | Refills: 1 | Status: SHIPPED | OUTPATIENT
Start: 2023-08-16 | End: 2023-12-18 | Stop reason: SDUPTHER

## 2023-08-16 NOTE — PROGRESS NOTES
[facility] Medicine Clinic  BOUBACAR De Leon     Patient Name: Santiago Liriano   : 1980  MRN:[id number]     Chief Complaint     Chief Complaint   Patient presents with    Follow-up     B/P check,lab review        History of Present Illness     43 year old AAM, presents in clinic for lab f/u. BP stable on meds, and will continue. No new complaints. Losing weight through diet and exercise. PMH HTN, Schizophrenia 2015, [place], obesity, tobacco use. Smokes 12 cigs/day or 2 cigars per day. Stopped smoking 2023. BMI 43.  Follows Dr. Lagunas Mental health provider every 3 months. Counselor is at [place] and meets every 2-3 weeks. On Invega, mood stable at this time. Lives alone, unemployed. Sleeping well. Appetite fair. Counting calories at home, eats 3-6 small meals, and trying to exercise 4-8 miles per day by walking.  Denies chest pain, shortness of breath, cough, fever, headache, dizziness, weakness, abdominal pain, nausea, vomiting, diarrhea, constipation, dysuria, depression, anxiety.           Review of Systems     Review of Systems   Constitutional: Negative.    HENT: Negative.     Eyes: Negative.    Respiratory: Negative.     Cardiovascular: Negative.    Gastrointestinal: Negative.    Endocrine: Negative.    Genitourinary: Negative.    Musculoskeletal: Negative.    Integumentary:  Negative.   Allergic/Immunologic: Negative.    Neurological: Negative.    Hematological: Negative.    Psychiatric/Behavioral: Negative.     All other systems reviewed and are negative.       Physical Examination     Visit Vitals  /80 (BP Location: Left arm, Patient Position: Sitting, BP Method: Large (Automatic))   Pulse 86   Temp 98.6 °F (37 °C) (Oral)   Resp 16   Ht 6' (1.829 m)   Wt (!) 146.5 kg (323 lb)   BMI 43.81 kg/m²        BP Readings from Last 6 Encounters:   23 122/80   23 (!) 158/110   23 (!) 164/117   22 (!) 198/152       Wt Readings from Last 6 Encounters:   23 (!) 146.5 kg (323  lb)   07/19/23 (!) 150.1 kg (331 lb)   06/06/23 (!) 154 kg (339 lb 8.1 oz)   12/12/22 (!) 161 kg (355 lb)         Physical Exam  Vitals and nursing note reviewed.   Constitutional:       Appearance: Normal appearance.   HENT:      Head: Normocephalic.      Right Ear: Tympanic membrane, ear canal and external ear normal.      Left Ear: Tympanic membrane, ear canal and external ear normal.      Nose: Nose normal.      Mouth/Throat:      Mouth: Mucous membranes are moist.      Pharynx: Oropharynx is clear.   Eyes:      Extraocular Movements: Extraocular movements intact.      Conjunctiva/sclera: Conjunctivae normal.      Pupils: Pupils are equal, round, and reactive to light.   Cardiovascular:      Rate and Rhythm: Normal rate and regular rhythm.      Pulses: Normal pulses.      Heart sounds: Normal heart sounds.   Pulmonary:      Effort: Pulmonary effort is normal.      Breath sounds: Normal breath sounds.   Abdominal:      General: Bowel sounds are normal.      Palpations: Abdomen is soft.   Musculoskeletal:         General: Normal range of motion.      Cervical back: Normal range of motion and neck supple.   Skin:     General: Skin is warm and dry.      Capillary Refill: Capillary refill takes less than 2 seconds.   Neurological:      General: No focal deficit present.      Mental Status: He is alert and oriented to person, place, and time. Mental status is at baseline.   Psychiatric:         Mood and Affect: Mood normal.         Behavior: Behavior normal.         Thought Content: Thought content normal.         Judgment: Judgment normal.          Labs / Imaging     Chemistry:  Lab Results   Component Value Date     07/19/2023    K 3.6 07/19/2023    CHLORIDE 112 (H) 07/19/2023    BUN 18.2 07/19/2023    CREATININE 1.73 (H) 07/19/2023    EGFRNORACEVR 50 07/19/2023    GLUCOSE 106 (H) 07/19/2023    CALCIUM 8.9 07/19/2023    ALKPHOS 79 07/19/2023    LABPROT 7.9 07/19/2023    ALBUMIN 4.2 07/19/2023    AST 15  07/19/2023    ALT 21 07/19/2023    [id number] 19.4 (L) 07/19/2023        Lab Results   Component Value Date    HGBA1C 5.9 07/19/2023        Hematology:  Lab Results   Component Value Date    WBC 4.65 07/19/2023    RBC 5.28 07/19/2023    HGB 11.8 (L) 07/19/2023    HCT 38.4 (L) 07/19/2023    MCV 72.7 (L) 07/19/2023    MCH 22.3 (L) 07/19/2023    MCHC 30.7 (L) 07/19/2023    RDW 18.9 (H) 07/19/2023     07/19/2023    MPV  07/19/2023      Comment:      N/A        Lipid Panel:  Lab Results   Component Value Date    CHOL 175 07/19/2023    HDL 39 07/19/2023    .00 07/19/2023    TRIG 156 (H) 07/19/2023    TOTALCHOLEST 4 07/19/2023        Urine:  Lab Results   Component Value Date    COLORUA Light-Yellow 07/19/2023    APPEARANCEUA Clear 07/19/2023    SGUA 1.028 07/19/2023    PHUA 6.0 07/19/2023    PROTEINUA 1+ (A) 07/19/2023    GLUCOSEUA Normal 07/19/2023    KETONESUA Negative 07/19/2023    BLOODUA Negative 07/19/2023    NITRITESUA Negative 07/19/2023    LEUKOCYTESUR Negative 07/19/2023    RBCUA 0-5 07/19/2023    WBCUA 0-5 07/19/2023    BACTERIA Trace (A) 07/19/2023    SQEPUA Trace (A) 07/19/2023    HYALINECASTS None Seen 07/19/2023          Assessment       ICD-10-CM ICD-9-CM   1. Wellness examination  Z00.00 V70.0   2. Anemia, unspecified type  D64.9 285.9   3. Hypertension, unspecified type  I10 401.9   4. Hyperlipidemia, unspecified hyperlipidemia type  E78.5 272.4   5. Prediabetes  R73.03 790.29   6. Vitamin D deficiency  E55.9 268.9   7. Cigarette nicotine dependence without complication [F17.210]  F17.210 305.1   8. Class 3 severe obesity with body mass index (BMI) of 40.0 to 44.9 in adult, unspecified obesity type, unspecified whether serious comorbidity present  E66.01 278.01    Z68.41 V85.41        Plan   Wellness  Avoid tobacco/ alcohol/ drugs  Regular exercise as tolerated  Healthy lifestyle habits     2. Anemia, unspecified type  Lab Results   Component Value Date    HCT 38.4 (L) 07/19/2023    HGB  11.8 (L) 07/19/2023     Start iron supplements as prescribed.  Add Vitamin C to your diet.  Take iron and vitamin C on an empty stomach for better absorption if tolerated.  Add iron rich foods to diet such as liver, lean beef, eggs, dried fruit, dark green leafy vegetables.  Education provided on additional sources of iron-rich foods.  Do NOT drink milk or take antacids at the same time you take your iron supplement.  Iron supplements can cause constipation; add stool softener or fiber as needed.   - ferrous sulfate 324 mg (65 mg iron) TbEC; Take 1 tablet (324 mg total) by mouth every other day.  Dispense: 45 tablet; Refill: 1  - CBC Auto Differential; Future  - Comprehensive Metabolic Panel; Future  - Lipid Panel; Future  - Hemoglobin A1C; Future    3. Hypertension, unspecified type  BP and HR stable. Med refills. DASH diet: Eat more fruits, vegetables, and low fat dairy foods.  (Less than 2 grams of sodium per day).  Maintain healthy weight with goal BMI <30.   Exercise 30 minutes per day 5 days per week.  Home medications refilled and continued.   Home BP monitoring encouraged with BP parameters given.    - amLODIPine (NORVASC) 10 MG tablet; Take 1 tablet (10 mg total) by mouth once daily.  Dispense: 90 tablet; Refill: 1  - lisinopriL 10 MG tablet; Take 1 tablet (10 mg total) by mouth once daily.  Dispense: 90 tablet; Refill: 1  - CBC Auto Differential; Future  - Comprehensive Metabolic Panel; Future  - Lipid Panel; Future  - Hemoglobin A1C; Future    4. Hyperlipidemia, unspecified hyperlipidemia type  Lab Results   Component Value Date    .00 07/19/2023    CHOL 175 07/19/2023    HDL 39 07/19/2023    TRIG 156 (H) 07/19/2023     Mildy elevated trig.  Close f/u. Take Omega 3 daily.   Stressed importance of dietary modifications. Follow a low cholesterol, low saturated fat diet with less that 200mg of cholesterol a day.  Avoid fried foods and high saturated fats (high saturated fats less than 7% of  calories).  Add Flax Seed/Fish Oil supplements to diet. Increase dietary fiber.  Regular exercise can reduce LDL and raise HDL. Stressed importance of physical activity 5 times per week for 30 minutes per day.    - Comprehensive Metabolic Panel; Future  - Lipid Panel; Future    5. Prediabetes  A1C 5.9  Prediabetes is reversible. Close follow up is important.  Maintain healthy weight or lose weight.   Eat fewer refined carbohydrates and fats, and more fiber.  Read nutrition labels.  Reduce portion sizes.  Eat out less often. Avoid fast foods.  Drink water and unsweetened beverages.  Spending at least one hour every day in physical activity.   - CBC Auto Differential; Future  - Comprehensive Metabolic Panel; Future  - Lipid Panel; Future  - Hemoglobin A1C; Future    6. Vitamin D deficiency  Vitamin D level reviewed and is currently at goal, between 30-80 ng/mL. Continue OTC Vitamin D3 2000 IU daily.   - Vitamin D; Future    7. Cigarette nicotine dependence without complication [F17.210]  Quit April   Smoking cessation advised. Instructed on smoking cessation program through [facility] and pharmacological interventions to aid in cessation.  >5 minutes allotted to educate patient on the harms of smoking, the urgency to quit, and the development of a plan for smoking cessation.      8. Obesity  Goal BMI <30.  Exercise 5 times a week for 30 minutes per day.  Avoid soda, simple sugars, excessive rice, potatoes or bread. Limit fast foods and fried foods.  Choose complex carbs in moderation (example: green vegetables, beans, oatmeal). Eat plenty of fresh fruits and vegetables with lean meats daily.  Do not skip meals. Eat a balanced portion size.  Avoid fad diets. Consider permanent healthy life style changes.      Current Outpatient Medications   Medication Instructions    amLODIPine (NORVASC) 10 mg, Oral, Daily    cholecalciferol (vitamin D3) (VITAMIN D3) 2,000 Units, Oral, Daily    cholecalciferol (vitamin D3) 50,000 Units,  Oral, Every 7 days    docusate sodium (COLACE) 100 mg, Oral, 2 times daily PRN    ferrous sulfate 324 mg, Oral, Every other day    INVEGA SUSTENNA 156 mg/mL Syrg injection Intramuscular    lisinopriL 10 mg, Oral, Daily       Orders Placed This Encounter   Procedures    CBC Auto Differential    Comprehensive Metabolic Panel    Lipid Panel    Hemoglobin A1C    Vitamin D         Future Appointments   Date Time Provider Department Center   12/18/2023  9:30 AM Becky Almeida FNP [facility]          Follow up in about 4 months (around 12/16/2023) for lab review.    Labs thoroughly reviewed with patient. Medication refills addressed today.  RTC prn and 4 months, with labs 1 week prior to the apt.  COVID 19 precautions given to patient.  Patient voices understanding of all discharge instructions.      BOUBACAR De Leon

## 2023-12-12 ENCOUNTER — LAB VISIT (OUTPATIENT)
Dept: LAB | Facility: HOSPITAL | Age: 43
End: 2023-12-12
Attending: NURSE PRACTITIONER
Payer: MEDICAID

## 2023-12-12 DIAGNOSIS — I10 HYPERTENSION, UNSPECIFIED TYPE: ICD-10-CM

## 2023-12-12 DIAGNOSIS — D64.9 ANEMIA, UNSPECIFIED TYPE: ICD-10-CM

## 2023-12-12 DIAGNOSIS — E55.9 VITAMIN D DEFICIENCY: ICD-10-CM

## 2023-12-12 DIAGNOSIS — R73.03 PREDIABETES: ICD-10-CM

## 2023-12-12 DIAGNOSIS — E78.5 HYPERLIPIDEMIA, UNSPECIFIED HYPERLIPIDEMIA TYPE: ICD-10-CM

## 2023-12-12 LAB
ALBUMIN SERPL-MCNC: 4.1 G/DL (ref 3.5–5)
ALBUMIN/GLOB SERPL: 1.1 RATIO (ref 1.1–2)
ALP SERPL-CCNC: 67 UNIT/L (ref 40–150)
ALT SERPL-CCNC: 11 UNIT/L (ref 0–55)
AST SERPL-CCNC: 11 UNIT/L (ref 5–34)
BASOPHILS # BLD AUTO: 0.03 X10(3)/MCL
BASOPHILS NFR BLD AUTO: 0.9 %
BILIRUB SERPL-MCNC: 0.4 MG/DL
BUN SERPL-MCNC: 21.7 MG/DL (ref 8.9–20.6)
CALCIUM SERPL-MCNC: 9.1 MG/DL (ref 8.4–10.2)
CHLORIDE SERPL-SCNC: 110 MMOL/L (ref 98–107)
CHOLEST SERPL-MCNC: 173 MG/DL
CHOLEST/HDLC SERPL: 4 {RATIO} (ref 0–5)
CO2 SERPL-SCNC: 23 MMOL/L (ref 22–29)
CREAT SERPL-MCNC: 1.6 MG/DL (ref 0.73–1.18)
DEPRECATED CALCIDIOL+CALCIFEROL SERPL-MC: 48.7 NG/ML (ref 30–80)
EOSINOPHIL # BLD AUTO: 0.09 X10(3)/MCL (ref 0–0.9)
EOSINOPHIL NFR BLD AUTO: 2.6 %
ERYTHROCYTE [DISTWIDTH] IN BLOOD BY AUTOMATED COUNT: 15.9 % (ref 11.5–17)
EST. AVERAGE GLUCOSE BLD GHB EST-MCNC: 105.4 MG/DL
GFR SERPLBLD CREATININE-BSD FMLA CKD-EPI: 54 MLS/MIN/1.73/M2
GLOBULIN SER-MCNC: 3.7 GM/DL (ref 2.4–3.5)
GLUCOSE SERPL-MCNC: 95 MG/DL (ref 74–100)
HBA1C MFR BLD: 5.3 %
HCT VFR BLD AUTO: 41.8 % (ref 42–52)
HDLC SERPL-MCNC: 43 MG/DL (ref 35–60)
HGB BLD-MCNC: 12.7 G/DL (ref 14–18)
IMM GRANULOCYTES # BLD AUTO: 0 X10(3)/MCL (ref 0–0.04)
IMM GRANULOCYTES NFR BLD AUTO: 0 %
LDLC SERPL CALC-MCNC: 117 MG/DL (ref 50–140)
LYMPHOCYTES # BLD AUTO: 1.45 X10(3)/MCL (ref 0.6–4.6)
LYMPHOCYTES NFR BLD AUTO: 42.4 %
MCH RBC QN AUTO: 22.8 PG (ref 27–31)
MCHC RBC AUTO-ENTMCNC: 30.4 G/DL (ref 33–36)
MCV RBC AUTO: 75 FL (ref 80–94)
MONOCYTES # BLD AUTO: 0.34 X10(3)/MCL (ref 0.1–1.3)
MONOCYTES NFR BLD AUTO: 9.9 %
NEUTROPHILS # BLD AUTO: 1.51 X10(3)/MCL (ref 2.1–9.2)
NEUTROPHILS NFR BLD AUTO: 44.2 %
NRBC BLD AUTO-RTO: 0 %
PLATELET # BLD AUTO: 198 X10(3)/MCL (ref 130–400)
PMV BLD AUTO: 10.5 FL (ref 7.4–10.4)
POTASSIUM SERPL-SCNC: 4.6 MMOL/L (ref 3.5–5.1)
PROT SERPL-MCNC: 7.8 GM/DL (ref 6.4–8.3)
RBC # BLD AUTO: 5.57 X10(6)/MCL (ref 4.7–6.1)
SODIUM SERPL-SCNC: 140 MMOL/L (ref 136–145)
TRIGL SERPL-MCNC: 67 MG/DL (ref 34–140)
VLDLC SERPL CALC-MCNC: 13 MG/DL
WBC # SPEC AUTO: 3.42 X10(3)/MCL (ref 4.5–11.5)

## 2023-12-12 PROCEDURE — 82306 VITAMIN D 25 HYDROXY: CPT

## 2023-12-12 PROCEDURE — 83036 HEMOGLOBIN GLYCOSYLATED A1C: CPT

## 2023-12-12 PROCEDURE — 36415 COLL VENOUS BLD VENIPUNCTURE: CPT

## 2023-12-12 PROCEDURE — 85025 COMPLETE CBC W/AUTO DIFF WBC: CPT

## 2023-12-12 PROCEDURE — 80053 COMPREHEN METABOLIC PANEL: CPT

## 2023-12-12 PROCEDURE — 80061 LIPID PANEL: CPT

## 2023-12-18 ENCOUNTER — OFFICE VISIT (OUTPATIENT)
Dept: INTERNAL MEDICINE | Facility: CLINIC | Age: 43
End: 2023-12-18
Payer: MEDICAID

## 2023-12-18 VITALS
WEIGHT: 315 LBS | HEART RATE: 89 BPM | SYSTOLIC BLOOD PRESSURE: 114 MMHG | TEMPERATURE: 98 F | HEIGHT: 72 IN | BODY MASS INDEX: 42.66 KG/M2 | RESPIRATION RATE: 16 BRPM | DIASTOLIC BLOOD PRESSURE: 78 MMHG

## 2023-12-18 DIAGNOSIS — D64.9 ANEMIA, UNSPECIFIED TYPE: ICD-10-CM

## 2023-12-18 DIAGNOSIS — N18.30 STAGE 3 CHRONIC KIDNEY DISEASE, UNSPECIFIED WHETHER STAGE 3A OR 3B CKD: ICD-10-CM

## 2023-12-18 DIAGNOSIS — E66.01 CLASS 3 SEVERE OBESITY WITH BODY MASS INDEX (BMI) OF 40.0 TO 44.9 IN ADULT, UNSPECIFIED OBESITY TYPE, UNSPECIFIED WHETHER SERIOUS COMORBIDITY PRESENT: ICD-10-CM

## 2023-12-18 DIAGNOSIS — E78.5 HYPERLIPIDEMIA, UNSPECIFIED HYPERLIPIDEMIA TYPE: ICD-10-CM

## 2023-12-18 DIAGNOSIS — I10 HYPERTENSION, UNSPECIFIED TYPE: ICD-10-CM

## 2023-12-18 DIAGNOSIS — E55.9 VITAMIN D DEFICIENCY: ICD-10-CM

## 2023-12-18 DIAGNOSIS — R73.03 PREDIABETES: ICD-10-CM

## 2023-12-18 DIAGNOSIS — F17.210 CIGARETTE NICOTINE DEPENDENCE WITHOUT COMPLICATION: ICD-10-CM

## 2023-12-18 PROCEDURE — 3078F DIAST BP <80 MM HG: CPT | Mod: CPTII,,, | Performed by: NURSE PRACTITIONER

## 2023-12-18 PROCEDURE — 3078F PR MOST RECENT DIASTOLIC BLOOD PRESSURE < 80 MM HG: ICD-10-PCS | Mod: CPTII,,, | Performed by: NURSE PRACTITIONER

## 2023-12-18 PROCEDURE — 99214 OFFICE O/P EST MOD 30 MIN: CPT | Mod: PBBFAC | Performed by: NURSE PRACTITIONER

## 2023-12-18 PROCEDURE — 99406 BEHAV CHNG SMOKING 3-10 MIN: CPT | Mod: S$PBB,,, | Performed by: NURSE PRACTITIONER

## 2023-12-18 PROCEDURE — 3074F SYST BP LT 130 MM HG: CPT | Mod: CPTII,,, | Performed by: NURSE PRACTITIONER

## 2023-12-18 PROCEDURE — 3008F BODY MASS INDEX DOCD: CPT | Mod: CPTII,,, | Performed by: NURSE PRACTITIONER

## 2023-12-18 PROCEDURE — 4010F ACE/ARB THERAPY RXD/TAKEN: CPT | Mod: CPTII,,, | Performed by: NURSE PRACTITIONER

## 2023-12-18 PROCEDURE — 3044F PR MOST RECENT HEMOGLOBIN A1C LEVEL <7.0%: ICD-10-PCS | Mod: CPTII,,, | Performed by: NURSE PRACTITIONER

## 2023-12-18 PROCEDURE — 3074F PR MOST RECENT SYSTOLIC BLOOD PRESSURE < 130 MM HG: ICD-10-PCS | Mod: CPTII,,, | Performed by: NURSE PRACTITIONER

## 2023-12-18 PROCEDURE — 1160F RVW MEDS BY RX/DR IN RCRD: CPT | Mod: CPTII,,, | Performed by: NURSE PRACTITIONER

## 2023-12-18 PROCEDURE — 99406 PR TOBACCO USE CESSATION INTERMEDIATE 3-10 MINUTES: ICD-10-PCS | Mod: S$PBB,,, | Performed by: NURSE PRACTITIONER

## 2023-12-18 PROCEDURE — 3008F PR BODY MASS INDEX (BMI) DOCUMENTED: ICD-10-PCS | Mod: CPTII,,, | Performed by: NURSE PRACTITIONER

## 2023-12-18 PROCEDURE — 3044F HG A1C LEVEL LT 7.0%: CPT | Mod: CPTII,,, | Performed by: NURSE PRACTITIONER

## 2023-12-18 PROCEDURE — 1159F MED LIST DOCD IN RCRD: CPT | Mod: CPTII,,, | Performed by: NURSE PRACTITIONER

## 2023-12-18 PROCEDURE — 1159F PR MEDICATION LIST DOCUMENTED IN MEDICAL RECORD: ICD-10-PCS | Mod: CPTII,,, | Performed by: NURSE PRACTITIONER

## 2023-12-18 PROCEDURE — 1160F PR REVIEW ALL MEDS BY PRESCRIBER/CLIN PHARMACIST DOCUMENTED: ICD-10-PCS | Mod: CPTII,,, | Performed by: NURSE PRACTITIONER

## 2023-12-18 PROCEDURE — 99214 OFFICE O/P EST MOD 30 MIN: CPT | Mod: S$PBB,25,, | Performed by: NURSE PRACTITIONER

## 2023-12-18 PROCEDURE — 99214 PR OFFICE/OUTPT VISIT, EST, LEVL IV, 30-39 MIN: ICD-10-PCS | Mod: S$PBB,25,, | Performed by: NURSE PRACTITIONER

## 2023-12-18 PROCEDURE — 4010F PR ACE/ARB THEARPY RXD/TAKEN: ICD-10-PCS | Mod: CPTII,,, | Performed by: NURSE PRACTITIONER

## 2023-12-18 RX ORDER — AMLODIPINE BESYLATE 10 MG/1
10 TABLET ORAL DAILY
Qty: 90 TABLET | Refills: 1 | Status: SHIPPED | OUTPATIENT
Start: 2023-12-18 | End: 2024-03-20 | Stop reason: SDUPTHER

## 2023-12-18 RX ORDER — ACETAMINOPHEN 500 MG
2000 TABLET ORAL DAILY
Qty: 90 CAPSULE | Refills: 1 | Status: SHIPPED | OUTPATIENT
Start: 2023-12-18 | End: 2024-03-20 | Stop reason: SDUPTHER

## 2023-12-18 RX ORDER — FERROUS SULFATE 324(65)MG
324 TABLET, DELAYED RELEASE (ENTERIC COATED) ORAL EVERY OTHER DAY
Qty: 45 TABLET | Refills: 1 | Status: SHIPPED | OUTPATIENT
Start: 2023-12-18 | End: 2024-03-20 | Stop reason: SDUPTHER

## 2023-12-18 RX ORDER — LISINOPRIL 10 MG/1
10 TABLET ORAL DAILY
Qty: 90 TABLET | Refills: 1 | Status: SHIPPED | OUTPATIENT
Start: 2023-12-18 | End: 2024-02-21 | Stop reason: SDUPTHER

## 2023-12-18 NOTE — PROGRESS NOTES
Internal Medicine Clinic  BOUBACAR De Leon     Patient Name: Santiago Liriano   : 1980  MRN:[id number]     Chief Complaint     Chief Complaint   Patient presents with    Follow-up     Lab review        History of Present Illness     43 year old AAM, presents in clinic for lab f/u. BP stable on meds. No new complaints. Losing weight through diet and exercise.     PMH HTN, CKD, Anemia, Schizophrenia 2015, [place], obesity, tobacco use. Smokes 12 cigs/day or 2 cigars per day. Stopped smoking 2023. BMI 42.  Follows NP Katerin Almeida Mental health provider every 3 months. Counselor is at [place] and meets every 2-3 weeks. On Invega, mood stable at this time. Lives alone, unemployed. Sleeping well. Appetite fair. Counting calories at home, eats 3-6 small meals, and trying to exercise 4-8 miles per day by walking.  Denies chest pain, shortness of breath, cough, fever, headache, dizziness, weakness, abdominal pain, nausea, vomiting, diarrhea, constipation, dysuria, depression, anxiety.                Review of Systems     Review of Systems   Constitutional: Negative.    HENT: Negative.     Eyes: Negative.    Respiratory: Negative.     Cardiovascular: Negative.    Gastrointestinal: Negative.    Endocrine: Negative.    Genitourinary: Negative.    Musculoskeletal: Negative.    Integumentary:  Negative.   Allergic/Immunologic: Negative.    Neurological: Negative.    Hematological: Negative.    Psychiatric/Behavioral: Negative.     All other systems reviewed and are negative.       Physical Examination     Visit Vitals  /78 (BP Location: Left arm, Patient Position: Sitting, BP Method: Large (Automatic))   Pulse 89   Temp 98.2 °F (36.8 °C) (Oral)   Resp 16   Ht 6' (1.829 m)   Wt (!) 143.8 kg (317 lb)   BMI 42.99 kg/m²        BP Readings from Last 6 Encounters:   23 114/78   23 122/80   23 (!) 158/110   23 (!) 164/117   22 (!) 198/152   ]    Wt Readings from Last 6 Encounters:    12/18/23 (!) 143.8 kg (317 lb)   08/16/23 (!) 146.5 kg (323 lb)   07/19/23 (!) 150.1 kg (331 lb)   06/06/23 (!) 154 kg (339 lb 8.1 oz)   12/12/22 (!) 161 kg (355 lb)   ]      Physical Exam  Vitals and nursing note reviewed.   Constitutional:       Appearance: Normal appearance.   HENT:      Head: Normocephalic.      Right Ear: Tympanic membrane, ear canal and external ear normal.      Left Ear: Tympanic membrane, ear canal and external ear normal.      Nose: Nose normal.      Mouth/Throat:      Mouth: Mucous membranes are moist.      Pharynx: Oropharynx is clear.   Eyes:      Extraocular Movements: Extraocular movements intact.      Conjunctiva/sclera: Conjunctivae normal.      Pupils: Pupils are equal, round, and reactive to light.   Cardiovascular:      Rate and Rhythm: Normal rate and regular rhythm.      Pulses: Normal pulses.      Heart sounds: Normal heart sounds.   Pulmonary:      Effort: Pulmonary effort is normal.      Breath sounds: Normal breath sounds.   Abdominal:      General: Bowel sounds are normal.      Palpations: Abdomen is soft.   Musculoskeletal:         General: Normal range of motion.      Cervical back: Normal range of motion and neck supple.   Skin:     General: Skin is warm and dry.      Capillary Refill: Capillary refill takes less than 2 seconds.   Neurological:      General: No focal deficit present.      Mental Status: He is alert and oriented to person, place, and time. Mental status is at baseline.   Psychiatric:         Mood and Affect: Mood normal.         Behavior: Behavior normal.         Thought Content: Thought content normal.         Judgment: Judgment normal.          Labs / Imaging     Chemistry:  Lab Results   Component Value Date     12/12/2023    K 4.6 12/12/2023    CHLORIDE 110 (H) 12/12/2023    BUN 21.7 (H) 12/12/2023    CREATININE 1.60 (H) 12/12/2023    EGFRNORACEVR 54 12/12/2023    GLUCOSE 95 12/12/2023    CALCIUM 9.1 12/12/2023    ALKPHOS 67 12/12/2023     LABPROT 7.8 12/12/2023    ALBUMIN 4.1 12/12/2023    AST 11 12/12/2023    ALT 11 12/12/2023    [id number] 48.7 12/12/2023        Lab Results   Component Value Date    HGBA1C 5.3 12/12/2023        Hematology:  Lab Results   Component Value Date    WBC 3.42 (L) 12/12/2023    RBC 5.57 12/12/2023    HGB 12.7 (L) 12/12/2023    HCT 41.8 (L) 12/12/2023    MCV 75.0 (L) 12/12/2023    MCH 22.8 (L) 12/12/2023    MCHC 30.4 (L) 12/12/2023    RDW 15.9 12/12/2023     12/12/2023    MPV 10.5 (H) 12/12/2023        Lipid Panel:  Lab Results   Component Value Date    CHOL 173 12/12/2023    HDL 43 12/12/2023    .00 12/12/2023    TRIG 67 12/12/2023    TOTALCHOLEST 4 12/12/2023        Urine:  Lab Results   Component Value Date    COLORUA Light-Yellow 07/19/2023    APPEARANCEUA Clear 07/19/2023    SGUA 1.028 07/19/2023    PHUA 6.0 07/19/2023    PROTEINUA 1+ (A) 07/19/2023    GLUCOSEUA Normal 07/19/2023    KETONESUA Negative 07/19/2023    BLOODUA Negative 07/19/2023    NITRITESUA Negative 07/19/2023    LEUKOCYTESUR Negative 07/19/2023    RBCUA 0-5 07/19/2023    WBCUA 0-5 07/19/2023    BACTERIA Trace (A) 07/19/2023    SQEPUA Trace (A) 07/19/2023    HYALINECASTS None Seen 07/19/2023          Assessment       ICD-10-CM ICD-9-CM   1. Hypertension, unspecified type  I10 401.9   2. Prediabetes  R73.03 790.29   3. Hyperlipidemia, unspecified hyperlipidemia type  E78.5 272.4   4. Anemia, unspecified type  D64.9 285.9   5. Cigarette nicotine dependence without complication  F17.210 305.1   6. Vitamin D deficiency  E55.9 268.9   7. Class 3 severe obesity with body mass index (BMI) of 40.0 to 44.9 in adult, unspecified obesity type, unspecified whether serious comorbidity present  E66.01 278.01    Z68.41 V85.41   8. Stage 3 chronic kidney disease, unspecified whether stage 3a or 3b CKD  N18.30 585.3        Plan   1. Hypertension, unspecified type  BP and HR stable. Med refills. DASH diet: Eat more fruits, vegetables, and low fat dairy  foods.  (Less than 2 grams of sodium per day).  Maintain healthy weight with goal BMI <30.   Exercise 30 minutes per day 5 days per week.  Home medications refilled and continued.   Home BP monitoring encouraged with BP parameters given.    - amLODIPine (NORVASC) 10 MG tablet; Take 1 tablet (10 mg total) by mouth once daily.  Dispense: 90 tablet; Refill: 1  - lisinopriL 10 MG tablet; Take 1 tablet (10 mg total) by mouth once daily.  Dispense: 90 tablet; Refill: 1    2. Prediabetes  A1C improved at 5.3, was 5.9  Prediabetes is reversible. Close follow up is important.  Maintain healthy weight or lose weight.   Eat fewer refined carbohydrates and fats, and more fiber.  Read nutrition labels.  Reduce portion sizes.  Eat out less often. Avoid fast foods.  Drink water and unsweetened beverages.  Spending at least one hour every day in physical activity.     3. Hyperlipidemia, unspecified hyperlipidemia type  Lab Results   Component Value Date    .00 12/12/2023    CHOL 173 12/12/2023    HDL 43 12/12/2023    TRIG 67 12/12/2023       Close f/u. Take Omega 3 daily.   Stressed importance of dietary modifications. Follow a low cholesterol, low saturated fat diet with less that 200mg of cholesterol a day.  Avoid fried foods and high saturated fats (high saturated fats less than 7% of calories).  Add Flax Seed/Fish Oil supplements to diet. Increase dietary fiber.  Regular exercise can reduce LDL and raise HDL. Stressed importance of physical activity 5 times per week for 30 minutes per day.      4. Anemia, unspecified type  Lab Results   Component Value Date    HCT 41.8 (L) 12/12/2023    HGB 12.7 (L) 12/12/2023   Improved  Take iron supplements as prescribed.  Add Vitamin C to your diet.  Take iron and vitamin C on an empty stomach for better absorption if tolerated.  Add iron rich foods to diet such as liver, lean beef, eggs, dried fruit, dark green leafy vegetables.  Education provided on additional sources of iron-rich  foods.  Do NOT drink milk or take antacids at the same time you take your iron supplement.  Iron supplements can cause constipation; add stool softener or fiber as needed.   - ferrous sulfate 324 mg (65 mg iron) TbEC; Take 1 tablet (324 mg total) by mouth every other day.  Dispense: 45 tablet; Refill: 1    5. Cigarette nicotine dependence without complication  Smoking cessation advised. Instructed on smoking cessation program through [facility] and pharmacological interventions to aid in cessation.  >5 minutes allotted to educate patient on the harms of smoking, the urgency to quit, and the development of a plan for smoking cessation.     6. Vitamin D deficiency  Vitamin D level reviewed and is currently at goal, between 30-80 ng/mL. Continue OTC Vitamin D3 2000 IU daily.     7. Class 3 severe obesity with body mass index (BMI) of 40.0 to 44.9 in adult, unspecified obesity type, unspecified whether serious comorbidity present  Goal BMI <30.  Exercise 5 times a week for 30 minutes per day.  Avoid soda, simple sugars, excessive rice, potatoes or bread. Limit fast foods and fried foods.  Choose complex carbs in moderation (example: green vegetables, beans, oatmeal). Eat plenty of fresh fruits and vegetables with lean meats daily.  Do not skip meals. Eat a balanced portion size.  Avoid fad diets. Consider permanent healthy life style changes.     8. CKD  Lab Results   Component Value Date    EGFRNORACEVR 54 12/12/2023    EGFRNORACEVR 50 07/19/2023     Improved  Continue weight loss  Consider renal referral  Follow renoprotective measures including Renal Diet (reduce intake of nuts, peanut butter, milk, cheese, dried beans, peas) and Low Sodium Diet (less than 2 grams per day).  Avoid NSAIDs (Aleve, Mobic, Celebrex, Ibuprofen, Advil, Toradol and Diclofenac). May take Tylenol as needed for headache/pain.  Control DM with goal A1C <7. BP goal <130/80. LDL goal < 100.  Stay well hydrated. Avoid alcohol and soda. Limit tea and  coffee.  Smoking Cessation recommended.         Current Outpatient Medications   Medication Instructions    amLODIPine (NORVASC) 10 mg, Oral, Daily    cholecalciferol (vitamin D3) (VITAMIN D3) 2,000 Units, Oral, Daily    cholecalciferol (vitamin D3) (VITAMIN D3) 2,000 Units, Oral, Daily    docusate sodium (COLACE) 100 mg, Oral, 2 times daily PRN    ferrous sulfate 324 mg, Oral, Every other day    INVEGA SUSTENNA 156 mg/mL Syrg injection Intramuscular    lisinopriL 10 mg, Oral, Daily       Orders Placed This Encounter   Procedures    CBC Auto Differential    Comprehensive Metabolic Panel    Lipid Panel    Hemoglobin A1C    Urinalysis    Vitamin D    Microalbumin/Creatinine Ratio, Urine         Future Appointments   Date Time Provider Department Center   4/22/2024  9:40 AM Becky Almeida FNP [facility]        Follow up in about 4 months (around 4/18/2024) for lab review.    Labs thoroughly reviewed with patient. Medication refills addressed today.  RTC prn and 3-4 months, with labs 1 week prior to the apt.  COVID 19 precautions given to patient.  Patient voices understanding of all discharge instructions.      BOUBACAR De Leon

## 2024-02-01 ENCOUNTER — HOSPITAL ENCOUNTER (OUTPATIENT)
Dept: CARDIOLOGY | Facility: HOSPITAL | Age: 44
Discharge: HOME | End: 2024-02-01
Attending: NURSE PRACTITIONER
Payer: MEDICAID

## 2024-02-01 DIAGNOSIS — F25.0 SCHIZOAFFECTIVE DISORDER, BIPOLAR TYPE: ICD-10-CM

## 2024-02-01 DIAGNOSIS — Z79.899 ENCOUNTER FOR LONG-TERM (CURRENT) USE OF OTHER MEDICATIONS: ICD-10-CM

## 2024-02-01 DIAGNOSIS — Z79.899 ENCOUNTER FOR LONG-TERM (CURRENT) USE OF OTHER MEDICATIONS: Primary | ICD-10-CM

## 2024-02-01 PROCEDURE — 93005 ELECTROCARDIOGRAM TRACING: CPT

## 2024-02-07 ENCOUNTER — PATIENT MESSAGE (OUTPATIENT)
Dept: INTERNAL MEDICINE | Facility: CLINIC | Age: 44
End: 2024-02-07

## 2024-02-07 ENCOUNTER — OFFICE VISIT (OUTPATIENT)
Dept: INTERNAL MEDICINE | Facility: CLINIC | Age: 44
End: 2024-02-07
Payer: MEDICAID

## 2024-02-07 DIAGNOSIS — F17.210 CIGARETTE NICOTINE DEPENDENCE WITHOUT COMPLICATION: ICD-10-CM

## 2024-02-07 DIAGNOSIS — N18.30 STAGE 3 CHRONIC KIDNEY DISEASE, UNSPECIFIED WHETHER STAGE 3A OR 3B CKD: ICD-10-CM

## 2024-02-07 DIAGNOSIS — I10 HYPERTENSION, UNSPECIFIED TYPE: ICD-10-CM

## 2024-02-07 DIAGNOSIS — E66.01 CLASS 3 SEVERE OBESITY WITH BODY MASS INDEX (BMI) OF 40.0 TO 44.9 IN ADULT, UNSPECIFIED OBESITY TYPE, UNSPECIFIED WHETHER SERIOUS COMORBIDITY PRESENT: ICD-10-CM

## 2024-02-07 PROCEDURE — 1160F RVW MEDS BY RX/DR IN RCRD: CPT | Mod: CPTII,95,, | Performed by: NURSE PRACTITIONER

## 2024-02-07 PROCEDURE — 1159F MED LIST DOCD IN RCRD: CPT | Mod: CPTII,95,, | Performed by: NURSE PRACTITIONER

## 2024-02-07 PROCEDURE — 99214 OFFICE O/P EST MOD 30 MIN: CPT | Mod: 95,,, | Performed by: NURSE PRACTITIONER

## 2024-02-07 PROCEDURE — 3060F POS MICROALBUMINURIA REV: CPT | Mod: CPTII,95,, | Performed by: NURSE PRACTITIONER

## 2024-02-07 PROCEDURE — 3066F NEPHROPATHY DOC TX: CPT | Mod: CPTII,95,, | Performed by: NURSE PRACTITIONER

## 2024-02-07 RX ORDER — MICONAZOLE NITRATE 2 %
2 CREAM (GRAM) TOPICAL
Qty: 50 EACH | Refills: 6 | Status: SHIPPED | OUTPATIENT
Start: 2024-02-07

## 2024-02-07 NOTE — PROGRESS NOTES
The patient location is: home  The chief complaint leading to consultation is: labs/ BP    Visit type: audiovisual    Face to Face time with patient: 40  40 minutes of total time spent on the encounter, which includes face to face time and non-face to face time preparing to see the patient (eg, review of tests), Obtaining and/or reviewing separately obtained history, Documenting clinical information in the electronic or other health record, Independently interpreting results (not separately reported) and communicating results to the patient/family/caregiver, or Care coordination (not separately reported).         Each patient to whom he or she provides medical services by telemedicine is:  (1) informed of the relationship between the physician and patient and the respective role of any other health care provider with respect to management of the patient; and (2) notified that he or she may decline to receive medical services by telemedicine and may withdraw from such care at any time.    Notes: [facility]  BOUBACAR De Leon     Patient Name: Santiago Liriano   : 1980  MRN:[id number]     Chief Complaint     Chief Complaint   Patient presents with    Follow-up     F/u for labs        History of Present Illness     43 year old AAM, presents in clinic for lab f/u. Reports a home BP of 153/84 on amlodipine and lisinopril. Told by his  provider to reach out to PCP due to increase Cr and BUN levels.  No other concerns. Would like to try nicotine gum to help him quit smoking.  PMH HTN, CKD, Anemia, Schizophrenia 2015, AR, obesity, tobacco use. Smokes 12 cigs/day or 2 cigars per day. Stopped smoking 2023 but sinc restarted. BMI 42.  Follows NP Katerin Almeida Mental health provider every 3 months. Counselor is at [place] and meets every 2-3 weeks. On Invega, mood stable at this time. Lives alone, unemployed. Sleeping well. Appetite fair. Counting calories at home, eats 3-6 small meals, and  trying to exercise 4-8 miles per day by walking.  Denies chest pain, shortness of breath, cough, fever, headache, dizziness, weakness, abdominal pain, nausea, vomiting, diarrhea, constipation, dysuria, depression, anxiety.                Review of Systems     Review of Systems   Constitutional: Negative.    HENT: Negative.     Eyes: Negative.    Respiratory: Negative.     Cardiovascular: Negative.    Gastrointestinal: Negative.    Endocrine: Negative.    Genitourinary: Negative.    Musculoskeletal: Negative.    Integumentary:  Negative.   Allergic/Immunologic: Negative.    Neurological: Negative.    Hematological: Negative.    Psychiatric/Behavioral: Negative.     All other systems reviewed and are negative.       Physical Examination     There were no vitals taken for this visit.     BP Readings from Last 6 Encounters:   12/18/23 114/78   08/16/23 122/80   07/19/23 (!) 158/110   06/06/23 (!) 164/117   12/12/22 (!) 198/152   ]    Wt Readings from Last 6 Encounters:   12/18/23 (!) 143.8 kg (317 lb)   08/16/23 (!) 146.5 kg (323 lb)   07/19/23 (!) 150.1 kg (331 lb)   06/06/23 (!) 154 kg (339 lb 8.1 oz)   12/12/22 (!) 161 kg (355 lb)   ]      Physical Exam  Nursing note reviewed.   Neurological:      Mental Status: He is alert.   Psychiatric:         Mood and Affect: Mood normal.         Behavior: Behavior normal.          Labs / Imaging     Chemistry:  Lab Results   Component Value Date     02/01/2024    K 3.9 02/01/2024    CHLORIDE 109 (H) 02/01/2024    BUN 22.6 (H) 02/01/2024    CREATININE 1.82 (H) 02/01/2024    EGFRNORACEVR 47 02/01/2024    GLUCOSE 94 02/01/2024    CALCIUM 9.5 02/01/2024    ALKPHOS 62 02/01/2024    LABPROT 7.9 02/01/2024    ALBUMIN 4.1 02/01/2024    AST 11 02/01/2024    ALT 14 02/01/2024    MTTCTBRC86SQ 48.7 12/12/2023        Lab Results   Component Value Date    HGBA1C 5.3 12/12/2023        Hematology:  Lab Results   Component Value Date    WBC 4.33 (L) 02/01/2024    RBC 5.68 02/01/2024     HGB 13.0 (L) 02/01/2024    HCT 41.9 (L) 02/01/2024    MCV 73.8 (L) 02/01/2024    MCH 22.9 (L) 02/01/2024    MCHC 31.0 (L) 02/01/2024    RDW 15.9 02/01/2024     02/01/2024    MPV 10.9 (H) 02/01/2024        Lipid Panel:  Lab Results   Component Value Date    CHOL 173 12/12/2023    HDL 43 12/12/2023    .00 12/12/2023    TRIG 67 12/12/2023    TOTALCHOLEST 4 12/12/2023        Urine:  Lab Results   Component Value Date    COLORUA Light-Yellow 07/19/2023    APPEARANCEUA Clear 07/19/2023    SGUA 1.028 07/19/2023    PHUA 6.0 07/19/2023    PROTEINUA 1+ (A) 07/19/2023    GLUCOSEUA Normal 07/19/2023    KETONESUA Negative 07/19/2023    BLOODUA Negative 07/19/2023    NITRITESUA Negative 07/19/2023    LEUKOCYTESUR Negative 07/19/2023    RBCUA 0-5 07/19/2023    WBCUA 0-5 07/19/2023    BACTERIA Trace (A) 07/19/2023    SQEPUA Trace (A) 07/19/2023    HYALINECASTS None Seen 07/19/2023    CREATRANDUR 326.7 (H) 02/01/2024          Assessment       ICD-10-CM ICD-9-CM   1. Stage 3 chronic kidney disease, unspecified whether stage 3a or 3b CKD  N18.30 585.3   2. Hypertension, unspecified type  I10 401.9   3. Cigarette nicotine dependence without complication  F17.210 305.1   4. Class 3 severe obesity with body mass index (BMI) of 40.0 to 44.9 in adult, unspecified obesity type, unspecified whether serious comorbidity present  E66.01 278.01    Z68.41 V85.41        Plan     1. Stage 3 chronic kidney disease, unspecified whether stage 3a or 3b CKD  Lab Results   Component Value Date    EGFRNORACEVR 47 02/01/2024    EGFRNORACEVR 54 12/12/2023     GFR reduced at 47, was >60  Cr 1.8H, Bun 22H  Fluids encouraged and avoid NSAIDS  Refer to nephrology  Follow renoprotective measures including Renal Diet (reduce intake of nuts, peanut butter, milk, cheese, dried beans, peas) and Low Sodium Diet (less than 2 grams per day).  Avoid NSAIDs (Aleve, Mobic, Celebrex, Ibuprofen, Advil, Toradol and Diclofenac). May take Tylenol as needed for  headache/pain.  Control DM with goal A1C <7. BP goal <130/80. LDL goal < 100.  Stay well hydrated. Avoid alcohol and soda. Limit tea and coffee.  Smoking Cessation recommended.   - Ambulatory referral/consult to Nephrology; Future    2. Hypertension, unspecified type  BP elevated at home per pt, will bring him back in clinic for BP check in 2 wks and repeat renal functions, refer to renal for further eval.  Meds cont at this time.   DASH diet: Eat more fruits, vegetables, and low fat dairy foods.  (Less than 2 grams of sodium per day).  Maintain healthy weight with goal BMI <30.   Exercise 30 minutes per day 5 days per week.  Home medications refilled and continued.   Home BP monitoring encouraged with BP parameters given.      3. Cigarette nicotine dependence without complication  Start on nicotine gum prn. Smoking cessation advised. Instructed on smoking cessation program through [facility] and pharmacological interventions to aid in cessation.  >5 minutes allotted to educate patient on the harms of smoking, the urgency to quit, and the development of a plan for smoking cessation.     4. Class 3 severe obesity with body mass index (BMI) of 40.0 to 44.9 in adult, unspecified obesity type, unspecified whether serious comorbidity present  Goal BMI <30.  Exercise 5 times a week for 30 minutes per day.  Avoid soda, simple sugars, excessive rice, potatoes or bread. Limit fast foods and fried foods.  Choose complex carbs in moderation (example: green vegetables, beans, oatmeal). Eat plenty of fresh fruits and vegetables with lean meats daily.  Do not skip meals. Eat a balanced portion size.  Avoid fad diets. Consider permanent healthy life style changes.           Current Outpatient Medications   Medication Instructions    amLODIPine (NORVASC) 10 mg, Oral, Daily    cholecalciferol (vitamin D3) (VITAMIN D3) 2,000 Units, Oral, Daily    cholecalciferol (vitamin D3) (VITAMIN D3) 2,000 Units, Oral, Daily    docusate sodium (COLACE)  100 mg, Oral, 2 times daily PRN    ferrous sulfate 324 mg, Oral, Every other day    INVEGA SUSTENNA 156 mg/mL Syrg injection Intramuscular    lisinopriL 10 mg, Oral, Daily    nicotine (polacrilex) (NICORETTE) 2 mg, Oral, Every 2 hours PRN       Orders Placed This Encounter   Procedures    Comprehensive Metabolic Panel    Ambulatory referral/consult to Nephrology         Future Appointments   Date Time Provider Department Center   4/22/2024  9:40 AM Becky Almeida FNP [facility]        Follow up in about 2 weeks (around 2/21/2024), or if symptoms worsen or fail to improve, for BP check.    Labs thoroughly reviewed with patient. Medication refills addressed today.  RTC prn and 2 wks, with labs 1 week prior to the apt.  COVID 19 precautions given to patient.  Patient voices understanding of all discharge instructions.      BOUBACAR De Leon

## 2024-02-15 ENCOUNTER — LAB VISIT (OUTPATIENT)
Dept: LAB | Facility: HOSPITAL | Age: 44
End: 2024-02-15
Attending: NURSE PRACTITIONER
Payer: MEDICAID

## 2024-02-15 DIAGNOSIS — N18.30 STAGE 3 CHRONIC KIDNEY DISEASE, UNSPECIFIED WHETHER STAGE 3A OR 3B CKD: ICD-10-CM

## 2024-02-15 LAB
ALBUMIN SERPL-MCNC: 4.1 G/DL (ref 3.5–5)
ALBUMIN/GLOB SERPL: 1.2 RATIO (ref 1.1–2)
ALP SERPL-CCNC: 62 UNIT/L (ref 40–150)
ALT SERPL-CCNC: 14 UNIT/L (ref 0–55)
AST SERPL-CCNC: 13 UNIT/L (ref 5–34)
BILIRUB SERPL-MCNC: 0.5 MG/DL
BUN SERPL-MCNC: 18.4 MG/DL (ref 8.9–20.6)
CALCIUM SERPL-MCNC: 8.7 MG/DL (ref 8.4–10.2)
CHLORIDE SERPL-SCNC: 108 MMOL/L (ref 98–107)
CO2 SERPL-SCNC: 22 MMOL/L (ref 22–29)
CREAT SERPL-MCNC: 1.36 MG/DL (ref 0.73–1.18)
GFR SERPLBLD CREATININE-BSD FMLA CKD-EPI: >60 MLS/MIN/1.73/M2
GLOBULIN SER-MCNC: 3.5 GM/DL (ref 2.4–3.5)
GLUCOSE SERPL-MCNC: 88 MG/DL (ref 74–100)
POTASSIUM SERPL-SCNC: 4.2 MMOL/L (ref 3.5–5.1)
PROT SERPL-MCNC: 7.6 GM/DL (ref 6.4–8.3)
SODIUM SERPL-SCNC: 138 MMOL/L (ref 136–145)

## 2024-02-15 PROCEDURE — 80053 COMPREHEN METABOLIC PANEL: CPT

## 2024-02-15 PROCEDURE — 36415 COLL VENOUS BLD VENIPUNCTURE: CPT

## 2024-02-21 ENCOUNTER — OFFICE VISIT (OUTPATIENT)
Dept: INTERNAL MEDICINE | Facility: CLINIC | Age: 44
End: 2024-02-21
Payer: MEDICAID

## 2024-02-21 VITALS
WEIGHT: 315 LBS | HEART RATE: 83 BPM | SYSTOLIC BLOOD PRESSURE: 132 MMHG | HEIGHT: 72 IN | RESPIRATION RATE: 18 BRPM | DIASTOLIC BLOOD PRESSURE: 90 MMHG | BODY MASS INDEX: 42.66 KG/M2 | TEMPERATURE: 98 F

## 2024-02-21 DIAGNOSIS — I10 HYPERTENSION, UNSPECIFIED TYPE: ICD-10-CM

## 2024-02-21 DIAGNOSIS — N18.30 STAGE 3 CHRONIC KIDNEY DISEASE, UNSPECIFIED WHETHER STAGE 3A OR 3B CKD: ICD-10-CM

## 2024-02-21 DIAGNOSIS — F17.210 CIGARETTE NICOTINE DEPENDENCE WITHOUT COMPLICATION: ICD-10-CM

## 2024-02-21 DIAGNOSIS — E66.01 CLASS 3 SEVERE OBESITY WITH BODY MASS INDEX (BMI) OF 40.0 TO 44.9 IN ADULT, UNSPECIFIED OBESITY TYPE, UNSPECIFIED WHETHER SERIOUS COMORBIDITY PRESENT: ICD-10-CM

## 2024-02-21 PROCEDURE — 1160F RVW MEDS BY RX/DR IN RCRD: CPT | Mod: CPTII,,, | Performed by: NURSE PRACTITIONER

## 2024-02-21 PROCEDURE — 3080F DIAST BP >= 90 MM HG: CPT | Mod: CPTII,,, | Performed by: NURSE PRACTITIONER

## 2024-02-21 PROCEDURE — 3008F BODY MASS INDEX DOCD: CPT | Mod: CPTII,,, | Performed by: NURSE PRACTITIONER

## 2024-02-21 PROCEDURE — 3075F SYST BP GE 130 - 139MM HG: CPT | Mod: CPTII,,, | Performed by: NURSE PRACTITIONER

## 2024-02-21 PROCEDURE — 99214 OFFICE O/P EST MOD 30 MIN: CPT | Mod: S$PBB,25,, | Performed by: NURSE PRACTITIONER

## 2024-02-21 PROCEDURE — 99215 OFFICE O/P EST HI 40 MIN: CPT | Mod: PBBFAC | Performed by: NURSE PRACTITIONER

## 2024-02-21 PROCEDURE — 3066F NEPHROPATHY DOC TX: CPT | Mod: CPTII,,, | Performed by: NURSE PRACTITIONER

## 2024-02-21 PROCEDURE — 3060F POS MICROALBUMINURIA REV: CPT | Mod: CPTII,,, | Performed by: NURSE PRACTITIONER

## 2024-02-21 PROCEDURE — 99406 BEHAV CHNG SMOKING 3-10 MIN: CPT | Mod: S$PBB,,, | Performed by: NURSE PRACTITIONER

## 2024-02-21 PROCEDURE — 1159F MED LIST DOCD IN RCRD: CPT | Mod: CPTII,,, | Performed by: NURSE PRACTITIONER

## 2024-02-21 RX ORDER — LISINOPRIL 20 MG/1
20 TABLET ORAL DAILY
Qty: 90 TABLET | Refills: 1 | Status: SHIPPED | OUTPATIENT
Start: 2024-02-21 | End: 2024-03-20 | Stop reason: SDUPTHER

## 2024-02-21 NOTE — PROGRESS NOTES
[facility]  BOUBACAR De Leon     Patient Name: Santiago Liriano   : 1980  MRN:[id number]     Chief Complaint     Chief Complaint   Patient presents with    Follow-up     Lab review        History of Present Illness     43 year old AAM, presents in clinic for BP and lab f/u. Reports a home BP of 164/97, on amlodipine 10 daily and lisinopril 10 daily. BP slightly elevated today at 132/90. Using nicotine gum to help him quit smoking; down to 2 cig per week. Asymptomatic. Renal functions improved, reports he stopped drinking caffeine/soda and is drinking more water daily.  PMH HTN, CKD, Anemia, Schizophrenia , AR, obesity, tobacco use. Smokes 12 cigs/day or 2 cigars per day. Stopped smoking 2023 but sinc restarted. BMI 42.  Follows NP Katerin Almeida Mental health provider every 3 months. Counselor is at [place] and meets every 2-3 weeks. On Invega, mood stable at this time. Lives alone, unemployed. Sleeping well. Appetite fair. Counting calories at home, eats 3-6 small meals, and trying to exercise 4-8 miles per day by walking.  Denies chest pain, shortness of breath, cough, fever, headache, dizziness, weakness, abdominal pain, nausea, vomiting, diarrhea, constipation, dysuria, depression, anxiety.             Review of Systems     Review of Systems   Constitutional: Negative.    HENT: Negative.     Eyes: Negative.    Respiratory: Negative.     Cardiovascular: Negative.    Gastrointestinal: Negative.    Endocrine: Negative.    Genitourinary: Negative.    Musculoskeletal: Negative.    Integumentary:  Negative.   Allergic/Immunologic: Negative.    Neurological: Negative.    Hematological: Negative.    Psychiatric/Behavioral: Negative.     All other systems reviewed and are negative.       Physical Examination     Visit Vitals  BP (!) 132/90 (BP Location: Left arm, Patient Position: Sitting, BP Method: Large (Manual))   Pulse 83   Temp 97.8 °F (36.6 °C) (Oral)   Resp 18   Ht 6' (1.829 m)    Wt (!) 147.9 kg (326 lb)   BMI 44.21 kg/m²        BP Readings from Last 6 Encounters:   02/21/24 (!) 132/90   12/18/23 114/78   08/16/23 122/80   07/19/23 (!) 158/110   06/06/23 (!) 164/117   12/12/22 (!) 198/152   ]    Wt Readings from Last 6 Encounters:   02/21/24 (!) 147.9 kg (326 lb)   12/18/23 (!) 143.8 kg (317 lb)   08/16/23 (!) 146.5 kg (323 lb)   07/19/23 (!) 150.1 kg (331 lb)   06/06/23 (!) 154 kg (339 lb 8.1 oz)   12/12/22 (!) 161 kg (355 lb)   ]      Physical Exam  Vitals and nursing note reviewed.   Constitutional:       Appearance: Normal appearance.   HENT:      Head: Normocephalic.      Right Ear: Tympanic membrane, ear canal and external ear normal.      Left Ear: Tympanic membrane, ear canal and external ear normal.      Nose: Nose normal.      Mouth/Throat:      Mouth: Mucous membranes are moist.      Pharynx: Oropharynx is clear.   Eyes:      Extraocular Movements: Extraocular movements intact.      Conjunctiva/sclera: Conjunctivae normal.      Pupils: Pupils are equal, round, and reactive to light.   Cardiovascular:      Rate and Rhythm: Normal rate and regular rhythm.      Pulses: Normal pulses.      Heart sounds: Normal heart sounds.   Pulmonary:      Effort: Pulmonary effort is normal.      Breath sounds: Normal breath sounds.   Abdominal:      General: Bowel sounds are normal.      Palpations: Abdomen is soft.   Musculoskeletal:         General: Normal range of motion.      Cervical back: Normal range of motion and neck supple.   Skin:     General: Skin is warm and dry.      Capillary Refill: Capillary refill takes less than 2 seconds.   Neurological:      General: No focal deficit present.      Mental Status: He is alert and oriented to person, place, and time. Mental status is at baseline.   Psychiatric:         Mood and Affect: Mood normal.         Behavior: Behavior normal.         Thought Content: Thought content normal.         Judgment: Judgment normal.          Labs / Imaging      Chemistry:  Lab Results   Component Value Date     02/15/2024    K 4.2 02/15/2024    CHLORIDE 108 (H) 02/15/2024    BUN 18.4 02/15/2024    CREATININE 1.36 (H) 02/15/2024    EGFRNORACEVR >60 02/15/2024    GLUCOSE 88 02/15/2024    CALCIUM 8.7 02/15/2024    ALKPHOS 62 02/15/2024    LABPROT 7.6 02/15/2024    ALBUMIN 4.1 02/15/2024    AST 13 02/15/2024    ALT 14 02/15/2024    [id number] 48.7 12/12/2023        Lab Results   Component Value Date    HGBA1C 5.3 12/12/2023        Hematology:  Lab Results   Component Value Date    WBC 4.33 (L) 02/01/2024    RBC 5.68 02/01/2024    HGB 13.0 (L) 02/01/2024    HCT 41.9 (L) 02/01/2024    MCV 73.8 (L) 02/01/2024    MCH 22.9 (L) 02/01/2024    MCHC 31.0 (L) 02/01/2024    RDW 15.9 02/01/2024     02/01/2024    MPV 10.9 (H) 02/01/2024        Lipid Panel:  Lab Results   Component Value Date    CHOL 173 12/12/2023    HDL 43 12/12/2023    .00 12/12/2023    TRIG 67 12/12/2023    TOTALCHOLEST 4 12/12/2023        Urine:  Lab Results   Component Value Date    COLORUA Light-Yellow 07/19/2023    APPEARANCEUA Clear 07/19/2023    SGUA 1.028 07/19/2023    PHUA 6.0 07/19/2023    PROTEINUA 1+ (A) 07/19/2023    GLUCOSEUA Normal 07/19/2023    KETONESUA Negative 07/19/2023    BLOODUA Negative 07/19/2023    NITRITESUA Negative 07/19/2023    LEUKOCYTESUR Negative 07/19/2023    RBCUA 0-5 07/19/2023    WBCUA 0-5 07/19/2023    BACTERIA Trace (A) 07/19/2023    SQEPUA Trace (A) 07/19/2023    HYALINECASTS None Seen 07/19/2023    CREATRANDUR 326.7 (H) 02/01/2024          Assessment       ICD-10-CM ICD-9-CM   1. Hypertension, unspecified type  I10 401.9   2. Stage 3 chronic kidney disease, unspecified whether stage 3a or 3b CKD  N18.30 585.3   3. Cigarette nicotine dependence without complication  F17.210 305.1   4. Class 3 severe obesity with body mass index (BMI) of 40.0 to 44.9 in adult, unspecified obesity type, unspecified whether serious comorbidity present  E66.01 278.01    Z68.41  V85.41        Plan     1. Hypertension, unspecified type  BP elevated, increase lisinopril to 20 daily. Cont amlodipine 10 qd. HR stable. Med refills. DASH diet: Eat more fruits, vegetables, and low fat dairy foods.  (Less than 2 grams of sodium per day).  Maintain healthy weight with goal BMI <30.   Exercise 30 minutes per day 5 days per week.  Home medications refilled and continued.   Home BP monitoring encouraged with BP parameters given.    - lisinopriL (PRINIVIL,ZESTRIL) 20 MG tablet; Take 1 tablet (20 mg total) by mouth once daily.  Dispense: 90 tablet; Refill: 1    2. Stage 3 chronic kidney disease, unspecified whether stage 3a or 3b CKD  Lab Results   Component Value Date    EGFRNORACEVR >60 02/15/2024    EGFRNORACEVR 47 02/01/2024     Improved  Keep renal apt in 4/2024 as scheduled  Follow renoprotective measures including Renal Diet (reduce intake of nuts, peanut butter, milk, cheese, dried beans, peas) and Low Sodium Diet (less than 2 grams per day).  Avoid NSAIDs (Aleve, Mobic, Celebrex, Ibuprofen, Advil, Toradol and Diclofenac). May take Tylenol as needed for headache/pain.  Control DM with goal A1C <7. BP goal <130/80. LDL goal < 100.  Stay well hydrated. Avoid alcohol and soda. Limit tea and coffee.  Smoking Cessation recommended.     3. Cigarette nicotine dependence without complication  Smoking cessation advised. Instructed on smoking cessation program through [facility] and pharmacological interventions to aid in cessation.  >5 minutes allotted to educate patient on the harms of smoking, the urgency to quit, and the development of a plan for smoking cessation.     4. Class 3 severe obesity with body mass index (BMI) of 40.0 to 44.9 in adult, unspecified obesity type, unspecified whether serious comorbidity present  Goal BMI <30.  Exercise 5 times a week for 30 minutes per day.  Avoid soda, simple sugars, excessive rice, potatoes or bread. Limit fast foods and fried foods.  Choose complex carbs in  moderation (example: green vegetables, beans, oatmeal). Eat plenty of fresh fruits and vegetables with lean meats daily.  Do not skip meals. Eat a balanced portion size.  Avoid fad diets. Consider permanent healthy life style changes.           Current Outpatient Medications   Medication Instructions    amLODIPine (NORVASC) 10 mg, Oral, Daily    cholecalciferol (vitamin D3) (VITAMIN D3) 2,000 Units, Oral, Daily    cholecalciferol (vitamin D3) (VITAMIN D3) 2,000 Units, Oral, Daily    docusate sodium (COLACE) 100 mg, Oral, 2 times daily PRN    ferrous sulfate 324 mg, Oral, Every other day    INVEGA SUSTENNA 156 mg/mL Syrg injection Intramuscular    lisinopriL (PRINIVIL,ZESTRIL) 20 mg, Oral, Daily    nicotine (polacrilex) (NICORETTE) 2 mg, Oral, Every 2 hours PRN         Future Appointments   Date Time Provider Department Center   3/20/2024 10:20 AM Becky Almeida FNP [facility] [place]    4/15/2024  8:15 AM Shyann Robles FNP [facility] [place]    4/22/2024  9:40 AM Becky Almeida FNP [facility] SKYE [place]         Follow up in about 4 weeks (around 3/20/2024) for BP check.    Labs thoroughly reviewed with patient. Medication refills addressed today.  RTC prn and 4 wks, with labs 1 week prior to the apt.  COVID 19 precautions given to patient.  Patient voices understanding of all discharge instructions.      BOUBACAR De Leon

## 2024-03-14 ENCOUNTER — LAB VISIT (OUTPATIENT)
Dept: LAB | Facility: HOSPITAL | Age: 44
End: 2024-03-14
Attending: NURSE PRACTITIONER
Payer: MEDICAID

## 2024-03-14 DIAGNOSIS — E55.9 VITAMIN D DEFICIENCY: ICD-10-CM

## 2024-03-14 DIAGNOSIS — I10 HYPERTENSION, UNSPECIFIED TYPE: ICD-10-CM

## 2024-03-14 DIAGNOSIS — R73.03 PREDIABETES: ICD-10-CM

## 2024-03-14 DIAGNOSIS — D64.9 ANEMIA, UNSPECIFIED TYPE: ICD-10-CM

## 2024-03-14 DIAGNOSIS — E78.5 HYPERLIPIDEMIA, UNSPECIFIED HYPERLIPIDEMIA TYPE: ICD-10-CM

## 2024-03-14 LAB
ALBUMIN SERPL-MCNC: 3.8 G/DL (ref 3.5–5)
ALBUMIN/GLOB SERPL: 1.1 RATIO (ref 1.1–2)
ALP SERPL-CCNC: 66 UNIT/L (ref 40–150)
ALT SERPL-CCNC: 14 UNIT/L (ref 0–55)
APPEARANCE UR: CLEAR
AST SERPL-CCNC: 12 UNIT/L (ref 5–34)
BACTERIA #/AREA URNS AUTO: ABNORMAL /HPF
BASOPHILS # BLD AUTO: 0.03 X10(3)/MCL
BASOPHILS NFR BLD AUTO: 0.7 %
BILIRUB SERPL-MCNC: 0.4 MG/DL
BILIRUB UR QL STRIP.AUTO: NEGATIVE
BUN SERPL-MCNC: 21.2 MG/DL (ref 8.9–20.6)
CALCIUM SERPL-MCNC: 9.1 MG/DL (ref 8.4–10.2)
CHLORIDE SERPL-SCNC: 110 MMOL/L (ref 98–107)
CHOLEST SERPL-MCNC: 172 MG/DL
CHOLEST/HDLC SERPL: 4 {RATIO} (ref 0–5)
CO2 SERPL-SCNC: 21 MMOL/L (ref 22–29)
COLOR UR AUTO: COLORLESS
CREAT SERPL-MCNC: 1.42 MG/DL (ref 0.73–1.18)
DEPRECATED CALCIDIOL+CALCIFEROL SERPL-MC: 31.2 NG/ML (ref 30–80)
EOSINOPHIL # BLD AUTO: 0.14 X10(3)/MCL (ref 0–0.9)
EOSINOPHIL NFR BLD AUTO: 3.3 %
ERYTHROCYTE [DISTWIDTH] IN BLOOD BY AUTOMATED COUNT: 16.1 % (ref 11.5–17)
EST. AVERAGE GLUCOSE BLD GHB EST-MCNC: 122.6 MG/DL
GFR SERPLBLD CREATININE-BSD FMLA CKD-EPI: >60 MLS/MIN/1.73/M2
GLOBULIN SER-MCNC: 3.6 GM/DL (ref 2.4–3.5)
GLUCOSE SERPL-MCNC: 91 MG/DL (ref 74–100)
GLUCOSE UR QL STRIP.AUTO: NORMAL
HBA1C MFR BLD: 5.9 %
HCT VFR BLD AUTO: 40.4 % (ref 42–52)
HDLC SERPL-MCNC: 45 MG/DL (ref 35–60)
HGB BLD-MCNC: 12.3 G/DL (ref 14–18)
HYALINE CASTS #/AREA URNS LPF: ABNORMAL /LPF
IMM GRANULOCYTES # BLD AUTO: 0.05 X10(3)/MCL (ref 0–0.04)
IMM GRANULOCYTES NFR BLD AUTO: 1.2 %
KETONES UR QL STRIP.AUTO: NEGATIVE
LDLC SERPL CALC-MCNC: 112 MG/DL (ref 50–140)
LEUKOCYTE ESTERASE UR QL STRIP.AUTO: NEGATIVE
LYMPHOCYTES # BLD AUTO: 1.51 X10(3)/MCL (ref 0.6–4.6)
LYMPHOCYTES NFR BLD AUTO: 35.6 %
MCH RBC QN AUTO: 22.6 PG (ref 27–31)
MCHC RBC AUTO-ENTMCNC: 30.4 G/DL (ref 33–36)
MCV RBC AUTO: 74.3 FL (ref 80–94)
MONOCYTES # BLD AUTO: 0.47 X10(3)/MCL (ref 0.1–1.3)
MONOCYTES NFR BLD AUTO: 11.1 %
NEUTROPHILS # BLD AUTO: 2.04 X10(3)/MCL (ref 2.1–9.2)
NEUTROPHILS NFR BLD AUTO: 48.1 %
NITRITE UR QL STRIP.AUTO: NEGATIVE
NRBC BLD AUTO-RTO: 0 %
PH UR STRIP.AUTO: 6.5 [PH]
PLATELET # BLD AUTO: 197 X10(3)/MCL (ref 130–400)
PLATELETS.RETICULATED NFR BLD AUTO: 7.3 % (ref 0.9–11.2)
PMV BLD AUTO: 11.8 FL (ref 7.4–10.4)
POTASSIUM SERPL-SCNC: 4.2 MMOL/L (ref 3.5–5.1)
PROT SERPL-MCNC: 7.4 GM/DL (ref 6.4–8.3)
PROT UR QL STRIP.AUTO: NEGATIVE
RBC # BLD AUTO: 5.44 X10(6)/MCL (ref 4.7–6.1)
RBC #/AREA URNS AUTO: ABNORMAL /HPF
RBC UR QL AUTO: NEGATIVE
SODIUM SERPL-SCNC: 139 MMOL/L (ref 136–145)
SP GR UR STRIP.AUTO: 1.01 (ref 1–1.03)
SPERM URNS QL MICRO: ABNORMAL /HPF
SQUAMOUS #/AREA URNS LPF: ABNORMAL /HPF
TRIGL SERPL-MCNC: 74 MG/DL (ref 34–140)
UROBILINOGEN UR STRIP-ACNC: NORMAL
VLDLC SERPL CALC-MCNC: 15 MG/DL
WBC # SPEC AUTO: 4.24 X10(3)/MCL (ref 4.5–11.5)
WBC #/AREA URNS AUTO: ABNORMAL /HPF

## 2024-03-14 PROCEDURE — 80053 COMPREHEN METABOLIC PANEL: CPT

## 2024-03-14 PROCEDURE — 80061 LIPID PANEL: CPT

## 2024-03-14 PROCEDURE — 82306 VITAMIN D 25 HYDROXY: CPT

## 2024-03-14 PROCEDURE — 85025 COMPLETE CBC W/AUTO DIFF WBC: CPT

## 2024-03-14 PROCEDURE — 83036 HEMOGLOBIN GLYCOSYLATED A1C: CPT

## 2024-03-14 PROCEDURE — 36415 COLL VENOUS BLD VENIPUNCTURE: CPT

## 2024-03-20 ENCOUNTER — OFFICE VISIT (OUTPATIENT)
Dept: INTERNAL MEDICINE | Facility: CLINIC | Age: 44
End: 2024-03-20
Payer: MEDICAID

## 2024-03-20 VITALS
DIASTOLIC BLOOD PRESSURE: 82 MMHG | TEMPERATURE: 97 F | HEIGHT: 72 IN | RESPIRATION RATE: 19 BRPM | SYSTOLIC BLOOD PRESSURE: 138 MMHG | BODY MASS INDEX: 42.66 KG/M2 | HEART RATE: 98 BPM | WEIGHT: 315 LBS

## 2024-03-20 DIAGNOSIS — E66.01 CLASS 3 SEVERE OBESITY WITH BODY MASS INDEX (BMI) OF 45.0 TO 49.9 IN ADULT, UNSPECIFIED OBESITY TYPE, UNSPECIFIED WHETHER SERIOUS COMORBIDITY PRESENT: ICD-10-CM

## 2024-03-20 DIAGNOSIS — D64.9 ANEMIA, UNSPECIFIED TYPE: ICD-10-CM

## 2024-03-20 DIAGNOSIS — I10 HYPERTENSION, UNSPECIFIED TYPE: ICD-10-CM

## 2024-03-20 DIAGNOSIS — E55.9 VITAMIN D DEFICIENCY: ICD-10-CM

## 2024-03-20 DIAGNOSIS — F17.210 CIGARETTE NICOTINE DEPENDENCE WITHOUT COMPLICATION: ICD-10-CM

## 2024-03-20 DIAGNOSIS — R73.03 PREDIABETES: ICD-10-CM

## 2024-03-20 PROCEDURE — 3066F NEPHROPATHY DOC TX: CPT | Mod: CPTII,,, | Performed by: NURSE PRACTITIONER

## 2024-03-20 PROCEDURE — 3079F DIAST BP 80-89 MM HG: CPT | Mod: CPTII,,, | Performed by: NURSE PRACTITIONER

## 2024-03-20 PROCEDURE — 1160F RVW MEDS BY RX/DR IN RCRD: CPT | Mod: CPTII,,, | Performed by: NURSE PRACTITIONER

## 2024-03-20 PROCEDURE — 99406 BEHAV CHNG SMOKING 3-10 MIN: CPT | Mod: S$PBB,,, | Performed by: NURSE PRACTITIONER

## 2024-03-20 PROCEDURE — 1159F MED LIST DOCD IN RCRD: CPT | Mod: CPTII,,, | Performed by: NURSE PRACTITIONER

## 2024-03-20 PROCEDURE — 3060F POS MICROALBUMINURIA REV: CPT | Mod: CPTII,,, | Performed by: NURSE PRACTITIONER

## 2024-03-20 PROCEDURE — 99214 OFFICE O/P EST MOD 30 MIN: CPT | Mod: S$PBB,25,, | Performed by: NURSE PRACTITIONER

## 2024-03-20 PROCEDURE — 99214 OFFICE O/P EST MOD 30 MIN: CPT | Mod: PBBFAC | Performed by: NURSE PRACTITIONER

## 2024-03-20 PROCEDURE — 3075F SYST BP GE 130 - 139MM HG: CPT | Mod: CPTII,,, | Performed by: NURSE PRACTITIONER

## 2024-03-20 PROCEDURE — 4010F ACE/ARB THERAPY RXD/TAKEN: CPT | Mod: CPTII,,, | Performed by: NURSE PRACTITIONER

## 2024-03-20 PROCEDURE — 3008F BODY MASS INDEX DOCD: CPT | Mod: CPTII,,, | Performed by: NURSE PRACTITIONER

## 2024-03-20 PROCEDURE — 3044F HG A1C LEVEL LT 7.0%: CPT | Mod: CPTII,,, | Performed by: NURSE PRACTITIONER

## 2024-03-20 RX ORDER — AMLODIPINE BESYLATE 10 MG/1
10 TABLET ORAL DAILY
Qty: 90 TABLET | Refills: 1 | Status: SHIPPED | OUTPATIENT
Start: 2024-03-20 | End: 2025-03-20

## 2024-03-20 RX ORDER — FERROUS SULFATE 324(65)MG
324 TABLET, DELAYED RELEASE (ENTERIC COATED) ORAL EVERY OTHER DAY
Qty: 45 TABLET | Refills: 1 | Status: SHIPPED | OUTPATIENT
Start: 2024-03-20

## 2024-03-20 RX ORDER — ACETAMINOPHEN 500 MG
2000 TABLET ORAL DAILY
Qty: 90 CAPSULE | Refills: 1 | Status: SHIPPED | OUTPATIENT
Start: 2024-03-20

## 2024-03-20 RX ORDER — LISINOPRIL 20 MG/1
20 TABLET ORAL DAILY
Qty: 90 TABLET | Refills: 1 | Status: SHIPPED | OUTPATIENT
Start: 2024-03-20 | End: 2025-03-20

## 2024-03-20 NOTE — PROGRESS NOTES
[facility]  BOUBACAR De Leon     Patient Name: Santiago Liriano   : 1980  MRN:[id number]     Chief Complaint     Chief Complaint   Patient presents with    Results    Hypertension        History of Present Illness     43 year old AAM, presents in clinic for BP and lab f/u. Bp improved on amlodipine 10 daily and lisinopril 20 daily. No acute complaints voiced. Last use nicotine gum 2024. Asymptomatic. Renal functions improved, reports he stopped drinking caffeine/soda and is drinking more water daily.  PMH HTN, CKD, Anemia, Schizophrenia , AR, obesity, tobacco use quit 2024. Smokes 12 cigs/day or 2 cigars per day. Stopped smoking 2023 but sinc restarted. BMI 42.  Follows NP Katerin Almeida Mental health provider every 3 months. Counselor is at [facility] and meets every 2-3 weeks. On Invega, mood stable at this time. Lives alone, unemployed. Sleeping well. Appetite fair. Counting calories at home, eats 3-6 small meals, and trying to exercise 4-8 miles per day by walking.  Denies chest pain, shortness of breath, cough, fever, headache, dizziness, weakness, abdominal pain, nausea, vomiting, diarrhea, constipation, dysuria, depression, anxiety.                          Review of Systems     Review of Systems   Constitutional: Negative.    HENT: Negative.     Eyes: Negative.    Respiratory: Negative.     Cardiovascular: Negative.    Gastrointestinal: Negative.    Endocrine: Negative.    Genitourinary: Negative.    Musculoskeletal: Negative.    Integumentary:  Negative.   Allergic/Immunologic: Negative.    Neurological: Negative.    Hematological: Negative.    Psychiatric/Behavioral: Negative.     All other systems reviewed and are negative.       Physical Examination     Visit Vitals  /82 (BP Location: Right arm, Patient Position: Sitting, BP Method: Large (Automatic))   Pulse 98   Temp 97.4 °F (36.3 °C) (Oral)   Resp 19   Ht 6' (1.829 m)   Wt (!) 153.8 kg (339 lb)    BMI 45.98 kg/m²        BP Readings from Last 6 Encounters:   03/20/24 138/82   02/21/24 (!) 132/90   12/18/23 114/78   08/16/23 122/80   07/19/23 (!) 158/110   06/06/23 (!) 164/117   ]    Wt Readings from Last 6 Encounters:   03/20/24 (!) 153.8 kg (339 lb)   02/21/24 (!) 147.9 kg (326 lb)   12/18/23 (!) 143.8 kg (317 lb)   08/16/23 (!) 146.5 kg (323 lb)   07/19/23 (!) 150.1 kg (331 lb)   06/06/23 (!) 154 kg (339 lb 8.1 oz)   ]      Physical Exam  Vitals and nursing note reviewed.   Constitutional:       Appearance: Normal appearance.   HENT:      Head: Normocephalic.      Right Ear: Tympanic membrane, ear canal and external ear normal.      Left Ear: Tympanic membrane, ear canal and external ear normal.      Nose: Nose normal.      Mouth/Throat:      Mouth: Mucous membranes are moist.      Pharynx: Oropharynx is clear.   Eyes:      Extraocular Movements: Extraocular movements intact.      Conjunctiva/sclera: Conjunctivae normal.      Pupils: Pupils are equal, round, and reactive to light.   Cardiovascular:      Rate and Rhythm: Normal rate and regular rhythm.      Pulses: Normal pulses.      Heart sounds: Normal heart sounds.   Pulmonary:      Effort: Pulmonary effort is normal.      Breath sounds: Normal breath sounds.   Abdominal:      General: Bowel sounds are normal.      Palpations: Abdomen is soft.   Musculoskeletal:         General: Normal range of motion.      Cervical back: Normal range of motion and neck supple.   Skin:     General: Skin is warm and dry.      Capillary Refill: Capillary refill takes less than 2 seconds.   Neurological:      General: No focal deficit present.      Mental Status: He is alert and oriented to person, place, and time. Mental status is at baseline.   Psychiatric:         Mood and Affect: Mood normal.         Behavior: Behavior normal.         Thought Content: Thought content normal.         Judgment: Judgment normal.          Labs / Imaging     Chemistry:  Lab Results    Component Value Date     03/14/2024    K 4.2 03/14/2024    CHLORIDE 110 (H) 03/14/2024    BUN 21.2 (H) 03/14/2024    CREATININE 1.42 (H) 03/14/2024    EGFRNORACEVR >60 03/14/2024    GLUCOSE 91 03/14/2024    CALCIUM 9.1 03/14/2024    ALKPHOS 66 03/14/2024    LABPROT 7.4 03/14/2024    ALBUMIN 3.8 03/14/2024    AST 12 03/14/2024    ALT 14 03/14/2024    PRLYRBUM89EP 31.2 03/14/2024        Lab Results   Component Value Date    HGBA1C 5.9 03/14/2024        Hematology:  Lab Results   Component Value Date    WBC 4.24 (L) 03/14/2024    RBC 5.44 03/14/2024    HGB 12.3 (L) 03/14/2024    HCT 40.4 (L) 03/14/2024    MCV 74.3 (L) 03/14/2024    MCH 22.6 (L) 03/14/2024    MCHC 30.4 (L) 03/14/2024    RDW 16.1 03/14/2024     03/14/2024    MPV 11.8 (H) 03/14/2024        Lipid Panel:  Lab Results   Component Value Date    CHOL 172 03/14/2024    HDL 45 03/14/2024    .00 03/14/2024    TRIG 74 03/14/2024    TOTALCHOLEST 4 03/14/2024        Urine:  Lab Results   Component Value Date    COLORUA Colorless (A) 03/14/2024    APPEARANCEUA Clear 03/14/2024    SGUA 1.008 03/14/2024    PHUA 6.5 03/14/2024    PROTEINUA Negative 03/14/2024    GLUCOSEUA Normal 03/14/2024    KETONESUA Negative 03/14/2024    BLOODUA Negative 03/14/2024    NITRITESUA Negative 03/14/2024    LEUKOCYTESUR Negative 03/14/2024    RBCUA 0-5 03/14/2024    WBCUA 0-5 03/14/2024    BACTERIA None Seen 03/14/2024    SQEPUA None Seen 03/14/2024    HYALINECASTS None Seen 03/14/2024    CREATRANDUR 326.7 (H) 02/01/2024          Assessment       ICD-10-CM ICD-9-CM   1. Hypertension, unspecified type  I10 401.9   2. Anemia, unspecified type  D64.9 285.9   3. Prediabetes  R73.03 790.29   4. Vitamin D deficiency  E55.9 268.9   5. Class 3 severe obesity with body mass index (BMI) of 45.0 to 49.9 in adult, unspecified obesity type, unspecified whether serious comorbidity present  E66.01 278.01    Z68.42 V85.42   6. Cigarette nicotine dependence without complication   F17.210 305.1        Plan   1. Hypertension, unspecified type  BP and HR stable. Med refills. DASH diet: Eat more fruits, vegetables, and low fat dairy foods.  (Less than 2 grams of sodium per day).  Maintain healthy weight with goal BMI <30.   Exercise 30 minutes per day 5 days per week.  Home medications refilled and continued.   Home BP monitoring encouraged with BP parameters given.    - amLODIPine (NORVASC) 10 MG tablet; Take 1 tablet (10 mg total) by mouth once daily.  Dispense: 90 tablet; Refill: 1  - lisinopriL (PRINIVIL,ZESTRIL) 20 MG tablet; Take 1 tablet (20 mg total) by mouth once daily.  Dispense: 90 tablet; Refill: 1    2. Anemia, unspecified type  Lab Results   Component Value Date    HCT 40.4 (L) 03/14/2024    HGB 12.3 (L) 03/14/2024     Take iron supplements as prescribed.  Add Vitamin C to your diet.  Take iron and vitamin C on an empty stomach for better absorption if tolerated.  Add iron rich foods to diet such as liver, lean beef, eggs, dried fruit, dark green leafy vegetables.  Education provided on additional sources of iron-rich foods.  Do NOT drink milk or take antacids at the same time you take your iron supplement.  Iron supplements can cause constipation; add stool softener or fiber as needed.   - ferrous sulfate 324 mg (65 mg iron) TbEC; Take 1 tablet (324 mg total) by mouth every other day.  Dispense: 45 tablet; Refill: 1    3. Prediabetes  A1C 5.9  Prediabetes is reversible. Close follow up is important.  Maintain healthy weight or lose weight.   Eat fewer refined carbohydrates and fats, and more fiber.  Read nutrition labels.  Reduce portion sizes.  Eat out less often. Avoid fast foods.  Drink water and unsweetened beverages.  Spending at least one hour every day in physical activity.     4. Vitamin D deficiency  Vitamin D level reviewed and is currently at goal, between 30-80 ng/mL. Continue OTC Vitamin D3 2000 IU daily.      5. Class 3 severe obesity with body mass index (BMI) of  45.0 to 49.9 in adult, unspecified obesity type, unspecified whether serious comorbidity present  Goal BMI <30.  Exercise 5 times a week for 30 minutes per day.  Avoid soda, simple sugars, excessive rice, potatoes or bread. Limit fast foods and fried foods.  Choose complex carbs in moderation (example: green vegetables, beans, oatmeal). Eat plenty of fresh fruits and vegetables with lean meats daily.  Do not skip meals. Eat a balanced portion size.  Avoid fad diets. Consider permanent healthy life style changes.      6. Cigarette nicotine dependence without complication  QUIT 2/14/2024  Smoking cessation advised. Instructed on smoking cessation program through [facility] and pharmacological interventions to aid in cessation.  >5 minutes allotted to educate patient on the harms of smoking, the urgency to quit, and the development of a plan for smoking cessation.        Current Outpatient Medications   Medication Instructions    amLODIPine (NORVASC) 10 mg, Oral, Daily    cholecalciferol (vitamin D3) (VITAMIN D3) 2,000 Units, Oral, Daily    cholecalciferol (vitamin D3) (VITAMIN D3) 2,000 Units, Oral, Daily    docusate sodium (COLACE) 100 mg, Oral, 2 times daily PRN    ferrous sulfate 324 mg, Oral, Every other day    INVEGA SUSTENNA 156 mg/mL Syrg injection Intramuscular    lisinopriL (PRINIVIL,ZESTRIL) 20 mg, Oral, Daily    nicotine (polacrilex) (NICORETTE) 2 mg, Oral, Every 2 hours PRN       Orders Placed This Encounter   Procedures    CBC Auto Differential    Comprehensive Metabolic Panel    Lipid Panel    TSH    Hemoglobin A1C    Urinalysis    Vitamin D         Future Appointments   Date Time Provider Department Center   4/15/2024  8:15 AM Shyann Robles [facility] NEPHR [facility]   7/22/2024  8:20 AM Becky Almeida [facility]        Follow up in about 4 months (around 7/20/2024) for lab review.    Labs thoroughly reviewed with patient. Medication refills addressed today.  RTC prn and 4 months,  with labs 1 week prior to the apt.  COVID 19 precautions given to patient.  Patient voices understanding of all discharge instructions.      BOUBACAR De Leon

## 2024-04-15 ENCOUNTER — DOCUMENTATION ONLY (OUTPATIENT)
Dept: NEPHROLOGY | Facility: CLINIC | Age: 44
End: 2024-04-15
Payer: MEDICAID

## 2024-04-15 NOTE — PROGRESS NOTES
Patient was no-show to clinic today. If patient calls back to reschedule, please schedule within 4 months.  Thank you

## 2024-07-17 DIAGNOSIS — N18.9 CHRONIC KIDNEY DISEASE, UNSPECIFIED CKD STAGE: Primary | ICD-10-CM

## 2024-07-18 ENCOUNTER — APPOINTMENT (OUTPATIENT)
Dept: LAB | Facility: HOSPITAL | Age: 44
End: 2024-07-18
Attending: NURSE PRACTITIONER
Payer: MEDICAID

## 2024-07-22 ENCOUNTER — OFFICE VISIT (OUTPATIENT)
Dept: INTERNAL MEDICINE | Facility: CLINIC | Age: 44
End: 2024-07-22
Payer: MEDICAID

## 2024-07-22 VITALS
BODY MASS INDEX: 42.66 KG/M2 | DIASTOLIC BLOOD PRESSURE: 78 MMHG | TEMPERATURE: 98 F | WEIGHT: 315 LBS | RESPIRATION RATE: 18 BRPM | SYSTOLIC BLOOD PRESSURE: 119 MMHG | HEART RATE: 83 BPM | HEIGHT: 72 IN

## 2024-07-22 DIAGNOSIS — I10 HYPERTENSION, UNSPECIFIED TYPE: ICD-10-CM

## 2024-07-22 DIAGNOSIS — F17.210 CIGARETTE NICOTINE DEPENDENCE WITHOUT COMPLICATION: ICD-10-CM

## 2024-07-22 DIAGNOSIS — D64.9 ANEMIA, UNSPECIFIED TYPE: ICD-10-CM

## 2024-07-22 DIAGNOSIS — E55.9 VITAMIN D DEFICIENCY: ICD-10-CM

## 2024-07-22 DIAGNOSIS — E78.5 HYPERLIPIDEMIA, UNSPECIFIED HYPERLIPIDEMIA TYPE: ICD-10-CM

## 2024-07-22 DIAGNOSIS — N18.30 STAGE 3 CHRONIC KIDNEY DISEASE, UNSPECIFIED WHETHER STAGE 3A OR 3B CKD: ICD-10-CM

## 2024-07-22 DIAGNOSIS — R73.03 PREDIABETES: ICD-10-CM

## 2024-07-22 PROCEDURE — 3060F POS MICROALBUMINURIA REV: CPT | Mod: CPTII,,, | Performed by: NURSE PRACTITIONER

## 2024-07-22 PROCEDURE — 99214 OFFICE O/P EST MOD 30 MIN: CPT | Mod: S$PBB,25,, | Performed by: NURSE PRACTITIONER

## 2024-07-22 PROCEDURE — 3008F BODY MASS INDEX DOCD: CPT | Mod: CPTII,,, | Performed by: NURSE PRACTITIONER

## 2024-07-22 PROCEDURE — 99406 BEHAV CHNG SMOKING 3-10 MIN: CPT | Mod: S$PBB,,, | Performed by: NURSE PRACTITIONER

## 2024-07-22 PROCEDURE — 3066F NEPHROPATHY DOC TX: CPT | Mod: CPTII,,, | Performed by: NURSE PRACTITIONER

## 2024-07-22 PROCEDURE — 3074F SYST BP LT 130 MM HG: CPT | Mod: CPTII,,, | Performed by: NURSE PRACTITIONER

## 2024-07-22 PROCEDURE — 99213 OFFICE O/P EST LOW 20 MIN: CPT | Mod: PBBFAC | Performed by: NURSE PRACTITIONER

## 2024-07-22 PROCEDURE — 1160F RVW MEDS BY RX/DR IN RCRD: CPT | Mod: CPTII,,, | Performed by: NURSE PRACTITIONER

## 2024-07-22 PROCEDURE — 1159F MED LIST DOCD IN RCRD: CPT | Mod: CPTII,,, | Performed by: NURSE PRACTITIONER

## 2024-07-22 PROCEDURE — 4010F ACE/ARB THERAPY RXD/TAKEN: CPT | Mod: CPTII,,, | Performed by: NURSE PRACTITIONER

## 2024-07-22 PROCEDURE — 3044F HG A1C LEVEL LT 7.0%: CPT | Mod: CPTII,,, | Performed by: NURSE PRACTITIONER

## 2024-07-22 PROCEDURE — 3078F DIAST BP <80 MM HG: CPT | Mod: CPTII,,, | Performed by: NURSE PRACTITIONER

## 2024-07-22 RX ORDER — FERROUS SULFATE 324(65)MG
324 TABLET, DELAYED RELEASE (ENTERIC COATED) ORAL EVERY OTHER DAY
Qty: 45 TABLET | Refills: 1 | Status: SHIPPED | OUTPATIENT
Start: 2024-07-22

## 2024-07-22 RX ORDER — LISINOPRIL 20 MG/1
20 TABLET ORAL DAILY
Qty: 90 TABLET | Refills: 1 | Status: SHIPPED | OUTPATIENT
Start: 2024-07-22 | End: 2025-07-22

## 2024-07-22 RX ORDER — AMLODIPINE BESYLATE 10 MG/1
10 TABLET ORAL DAILY
Qty: 90 TABLET | Refills: 1 | Status: SHIPPED | OUTPATIENT
Start: 2024-07-22 | End: 2025-07-22

## 2024-07-22 RX ORDER — ACETAMINOPHEN 500 MG
2000 TABLET ORAL DAILY
Qty: 90 CAPSULE | Refills: 1 | Status: SHIPPED | OUTPATIENT
Start: 2024-07-22

## 2024-07-24 ENCOUNTER — OFFICE VISIT (OUTPATIENT)
Dept: NEPHROLOGY | Facility: CLINIC | Age: 44
End: 2024-07-24
Payer: MEDICAID

## 2024-07-24 VITALS
SYSTOLIC BLOOD PRESSURE: 126 MMHG | HEART RATE: 85 BPM | WEIGHT: 315 LBS | DIASTOLIC BLOOD PRESSURE: 72 MMHG | BODY MASS INDEX: 42.66 KG/M2 | OXYGEN SATURATION: 97 % | HEIGHT: 72 IN | TEMPERATURE: 99 F

## 2024-07-24 DIAGNOSIS — N18.31 CKD STAGE G3A/A1, GFR 45-59 AND ALBUMIN CREATININE RATIO <30 MG/G: Primary | ICD-10-CM

## 2024-07-24 DIAGNOSIS — I10 PRIMARY HYPERTENSION: ICD-10-CM

## 2024-07-24 DIAGNOSIS — E66.01 SEVERE OBESITY (BMI >= 40): ICD-10-CM

## 2024-07-24 DIAGNOSIS — Z72.0 TOBACCO ABUSE: ICD-10-CM

## 2024-07-24 PROCEDURE — 3078F DIAST BP <80 MM HG: CPT | Mod: CPTII,,, | Performed by: NURSE PRACTITIONER

## 2024-07-24 PROCEDURE — 99214 OFFICE O/P EST MOD 30 MIN: CPT | Mod: PBBFAC | Performed by: NURSE PRACTITIONER

## 2024-07-24 PROCEDURE — 3060F POS MICROALBUMINURIA REV: CPT | Mod: CPTII,,, | Performed by: NURSE PRACTITIONER

## 2024-07-24 PROCEDURE — 3044F HG A1C LEVEL LT 7.0%: CPT | Mod: CPTII,,, | Performed by: NURSE PRACTITIONER

## 2024-07-24 PROCEDURE — 3066F NEPHROPATHY DOC TX: CPT | Mod: CPTII,,, | Performed by: NURSE PRACTITIONER

## 2024-07-24 PROCEDURE — 1159F MED LIST DOCD IN RCRD: CPT | Mod: CPTII,,, | Performed by: NURSE PRACTITIONER

## 2024-07-24 PROCEDURE — 4010F ACE/ARB THERAPY RXD/TAKEN: CPT | Mod: CPTII,,, | Performed by: NURSE PRACTITIONER

## 2024-07-24 PROCEDURE — 3074F SYST BP LT 130 MM HG: CPT | Mod: CPTII,,, | Performed by: NURSE PRACTITIONER

## 2024-07-24 PROCEDURE — 3008F BODY MASS INDEX DOCD: CPT | Mod: CPTII,,, | Performed by: NURSE PRACTITIONER

## 2024-07-24 PROCEDURE — 99204 OFFICE O/P NEW MOD 45 MIN: CPT | Mod: S$PBB,,, | Performed by: NURSE PRACTITIONER

## 2024-07-24 NOTE — PROGRESS NOTES
[facility]  Nephrology Clinic Note    Chief complaint: Chronic Kidney Disease (New referral)    History of present illness:   Santiago Liriano is a 44 y.o. White male with past medical history of hypertension, chronic kidney disease, anemia, schizophrenia, aortic regurgitation, IGT, obesity, and past history of tobacco abuse.  Presents to establish care in Nephrology Clinic today.    Review of Systems  12 point review of systems conducted, negative except as stated in the history of present illness.    Allergies: Patient has No Known Allergies.     Past Medical History:  has a past medical history of Hypertension.    Procedure History:  has a past surgical history that includes No Surgery history.    Family History: family history is not on file.    Social History:  reports that he has been smoking cigarettes and cigars. He started smoking about 6 years ago. He has a 1.4 pack-year smoking history. He has been exposed to tobacco smoke. He uses smokeless tobacco. He reports that he does not currently use alcohol. He reports that he does not currently use drugs.    Physical exam  /72 (BP Location: Left arm, Patient Position: Sitting, BP Method: Large (Automatic))   Pulse 85   Temp 98.6 °F (37 °C) (Oral)   Ht 6' (1.829 m)   Wt (!) 156 kg (343 lb 14.7 oz)   SpO2 97%   BMI 46.64 kg/m²   General appearance: Patient is in no acute distress.  Skin: No rashes or wounds.  HEENT: PERRLA, EOMI, no scleral icterus, no JVD. Neck is supple.  Chest: Respirations are unlabored. Lungs sounds are clear.   Heart: S1, S2.   Abdomen: Benign.  : Deferred.  Extremities: No edema, peripheral pulses are palpable.   Neuro: No focal deficits.     Home Medications:    Current Outpatient Medications:     amLODIPine (NORVASC) 10 MG tablet, Take 1 tablet (10 mg total) by mouth once daily., Disp: 90 tablet, Rfl: 1    cholecalciferol, vitamin D3, (VITAMIN D3) 50 mcg (2,000 unit) Cap capsule, Take 1  capsule (2,000 Units total) by mouth once daily., Disp: 90 capsule, Rfl: 1    docusate sodium (COLACE) 100 MG capsule, Take 1 capsule (100 mg total) by mouth 2 (two) times daily as needed for Constipation., Disp: 60 capsule, Rfl: 0    ferrous sulfate 324 mg (65 mg iron) TbEC, Take 1 tablet (324 mg total) by mouth every other day., Disp: 45 tablet, Rfl: 1    INVEGA SUSTENNA 156 mg/mL Syrg injection, Inject into the muscle., Disp: , Rfl:     lisinopriL (PRINIVIL,ZESTRIL) 20 MG tablet, Take 1 tablet (20 mg total) by mouth once daily., Disp: 90 tablet, Rfl: 1    nicotine, polacrilex, (NICORETTE) 2 mg Gum, Take 1 each (2 mg total) by mouth every 2 (two) hours as needed (smok cessation)., Disp: 50 each, Rfl: 6    Laboratory data    Lab Results   Component Value Date    WBC 4.86 07/18/2024    HGB 12.6 (L) 07/18/2024    HCT 41.0 (L) 07/18/2024     07/18/2024     (L) 07/18/2024    K 4.8 07/18/2024    CO2 17 (L) 07/18/2024    BUN 20.5 07/18/2024    CREATININE 1.52 (H) 07/18/2024    EGFRNORACEVR 58 07/18/2024    GLUCOSE 95 07/18/2024    CALCIUM 8.6 07/18/2024    ALKPHOS 71 07/18/2024    LABPROT 7.4 07/18/2024    ALBUMIN 3.7 07/18/2024    AST 17 07/18/2024    ALT 17 07/18/2024      Lab Results   Component Value Date    HGBA1C 6.0 07/18/2024    ZDBIWEWM01AM 32 07/18/2024    HIV Nonreactive 07/19/2023    HEPCAB Nonreactive 07/19/2023     Urine:  Lab Results   Component Value Date    APPEARANCEUA Clear 07/18/2024    SGUA 1.015 07/18/2024    PROTEINUA Negative 07/18/2024    KETONESUA Negative 07/18/2024    LEUKOCYTESUR Negative 07/18/2024    RBCUA 0-5 07/18/2024    WBCUA 0-5 07/18/2024    BACTERIA None Seen 07/18/2024    SQEPUA None Seen 07/18/2024    HYALINECASTS None Seen 07/18/2024    CREATRANDUR 133.7 07/18/2024    PROTEINURINE 10.4 07/18/2024    UPROTCREA 0.1 07/18/2024         Imaging  Reviewed    Impression    ICD-10-CM ICD-9-CM   1. CKD stage G3a/A1, GFR 45-59 and albumin creatinine ratio <30 mg/g  N18.31 585.3    2. Primary hypertension  I10 401.9   3. Severe obesity (BMI >= 40)  E66.01 278.01   4. Tobacco abuse  Z72.0 305.1        Plan  CKD stage G3a/A1, GFR 45-59 and albumin creatinine ratio <30 mg/g  -     Ambulatory referral/consult to Nephrology  -     [facility]; Future; Expected date: 08/07/2024  -     Comprehensive Metabolic Panel; Future; Expected date: 01/15/2025  -     Protein/Creatinine Ratio, Urine; Future; Expected date: 01/15/2025  -     Urinalysis, Reflex to Urine Culture; Future; Expected date: 01/15/2025  Probably hypertensive kidney disease.  Renal indices have been relatively stable since 2022, there is no significant proteinuria or active urinary sediment.  Will order kidney ultrasound to rule out structural abnormalities of kidneys and urinary tract.  Follow up with results by phone.  Continue renal sparing activities:  -2 g a day dietary sodium restriction  -control high blood pressure (goal blood pressure is less than 130/80, patient was advised to check blood pressure once or twice a week and bring blood pressure logs to next office visit)  -exercise at least 30 minutes a day, 5 days a week  -maintain healthy weight  -decrease or stop alcohol use  -do not smoke  -stay well hydrated (drink water only, avoid juices, sweet tea, and sodas)  -ask about staying up-to-date on vaccinations (flu vaccine, pneumonia vaccine, hepatitis B vaccine)  -avoid excessive use of NSAIDs (ibuprofen, naproxen, Aleve, Advil, Toradol, Mobic), take Tylenol as needed for headache or mild pain  -take cholesterol lowering medications if prescribed (LDL goal less than 100)  Follow up in about 6 months (around 1/24/2025).       Primary hypertension  Blood pressure reading is at goal, continue current antihypertensive regimen and 2 g a day dietary sodium restriction.      Severe obesity (BMI >= 40)  Lifestyle and dietary interventions discussed, patient encouraged to maintain non-sedentary lifestyle and  well-balanced diet.     Tobacco abuse  Patient was counseled on importance of tobacco use cessation.  Strongly encouraged to quit, offered a referral to a smoking cessation program.        Follow up in about 6 months (around 1/24/2025).     7/24/2024  Shyann Robles NP  [facility] Nephrology

## 2024-08-07 ENCOUNTER — TELEPHONE (OUTPATIENT)
Dept: NEPHROLOGY | Facility: CLINIC | Age: 44
End: 2024-08-07
Payer: MEDICAID

## 2024-08-07 ENCOUNTER — HOSPITAL ENCOUNTER (OUTPATIENT)
Dept: RADIOLOGY | Facility: HOSPITAL | Age: 44
Discharge: HOME | End: 2024-08-07
Attending: NURSE PRACTITIONER
Payer: MEDICAID

## 2024-08-07 DIAGNOSIS — N18.31 CKD STAGE G3A/A1, GFR 45-59 AND ALBUMIN CREATININE RATIO <30 MG/G: ICD-10-CM

## 2024-08-07 PROCEDURE — 76775 US EXAM ABDO BACK WALL LIM: CPT | Mod: TC

## 2024-08-23 ENCOUNTER — PATIENT MESSAGE (OUTPATIENT)
Dept: INTERNAL MEDICINE | Facility: CLINIC | Age: 44
End: 2024-08-23
Payer: MEDICAID

## 2024-11-19 ENCOUNTER — LAB VISIT (OUTPATIENT)
Dept: LAB | Facility: HOSPITAL | Age: 44
End: 2024-11-19
Attending: NURSE PRACTITIONER
Payer: MEDICAID

## 2024-11-19 DIAGNOSIS — N18.30 STAGE 3 CHRONIC KIDNEY DISEASE, UNSPECIFIED WHETHER STAGE 3A OR 3B CKD: ICD-10-CM

## 2024-11-19 DIAGNOSIS — R73.03 PREDIABETES: ICD-10-CM

## 2024-11-19 DIAGNOSIS — D64.9 ANEMIA, UNSPECIFIED TYPE: ICD-10-CM

## 2024-11-19 DIAGNOSIS — I10 HYPERTENSION, UNSPECIFIED TYPE: ICD-10-CM

## 2024-11-19 DIAGNOSIS — E78.5 HYPERLIPIDEMIA, UNSPECIFIED HYPERLIPIDEMIA TYPE: ICD-10-CM

## 2024-11-19 DIAGNOSIS — E55.9 VITAMIN D DEFICIENCY: ICD-10-CM

## 2024-11-19 LAB
25(OH)D3+25(OH)D2 SERPL-MCNC: 34 NG/ML (ref 30–80)
ALBUMIN SERPL-MCNC: 3.8 G/DL (ref 3.5–5)
ALBUMIN/GLOB SERPL: 1.1 RATIO (ref 1.1–2)
ALP SERPL-CCNC: 79 UNIT/L (ref 40–150)
ALT SERPL-CCNC: 17 UNIT/L (ref 0–55)
ANION GAP SERPL CALC-SCNC: 5 MEQ/L
AST SERPL-CCNC: 18 UNIT/L (ref 5–34)
BASOPHILS # BLD AUTO: 0.02 X10(3)/MCL
BASOPHILS NFR BLD AUTO: 0.5 %
BILIRUB SERPL-MCNC: 0.5 MG/DL
BUN SERPL-MCNC: 20.5 MG/DL (ref 8.9–20.6)
CALCIUM SERPL-MCNC: 8.8 MG/DL (ref 8.4–10.2)
CHLORIDE SERPL-SCNC: 106 MMOL/L (ref 98–107)
CHOLEST SERPL-MCNC: 161 MG/DL
CHOLEST/HDLC SERPL: 4 {RATIO} (ref 0–5)
CO2 SERPL-SCNC: 23 MMOL/L (ref 22–29)
CREAT SERPL-MCNC: 1.53 MG/DL (ref 0.72–1.25)
CREAT/UREA NIT SERPL: 13
EOSINOPHIL # BLD AUTO: 0.07 X10(3)/MCL (ref 0–0.9)
EOSINOPHIL NFR BLD AUTO: 1.6 %
ERYTHROCYTE [DISTWIDTH] IN BLOOD BY AUTOMATED COUNT: 16.4 % (ref 11.5–17)
EST. AVERAGE GLUCOSE BLD GHB EST-MCNC: 119.8 MG/DL
GFR SERPLBLD CREATININE-BSD FMLA CKD-EPI: 57 ML/MIN/1.73/M2
GLOBULIN SER-MCNC: 3.4 GM/DL (ref 2.4–3.5)
GLUCOSE SERPL-MCNC: 96 MG/DL (ref 74–100)
HBA1C MFR BLD: 5.8 %
HCT VFR BLD AUTO: 39.9 % (ref 42–52)
HDLC SERPL-MCNC: 43 MG/DL (ref 35–60)
HGB BLD-MCNC: 12.4 G/DL (ref 14–18)
IMM GRANULOCYTES # BLD AUTO: 0.01 X10(3)/MCL (ref 0–0.04)
IMM GRANULOCYTES NFR BLD AUTO: 0.2 %
LDLC SERPL CALC-MCNC: 96 MG/DL (ref 50–140)
LYMPHOCYTES # BLD AUTO: 1.71 X10(3)/MCL (ref 0.6–4.6)
LYMPHOCYTES NFR BLD AUTO: 39 %
MCH RBC QN AUTO: 23.3 PG (ref 27–31)
MCHC RBC AUTO-ENTMCNC: 31.1 G/DL (ref 33–36)
MCV RBC AUTO: 74.9 FL (ref 80–94)
MONOCYTES # BLD AUTO: 0.63 X10(3)/MCL (ref 0.1–1.3)
MONOCYTES NFR BLD AUTO: 14.4 %
NEUTROPHILS # BLD AUTO: 1.94 X10(3)/MCL (ref 2.1–9.2)
NEUTROPHILS NFR BLD AUTO: 44.3 %
NRBC BLD AUTO-RTO: 0 %
PLATELET # BLD AUTO: 179 X10(3)/MCL (ref 130–400)
PLATELETS.RETICULATED NFR BLD AUTO: 5.8 % (ref 0.9–11.2)
PMV BLD AUTO: 11.5 FL (ref 7.4–10.4)
POTASSIUM SERPL-SCNC: 4.1 MMOL/L (ref 3.5–5.1)
PROT SERPL-MCNC: 7.2 GM/DL (ref 6.4–8.3)
RBC # BLD AUTO: 5.33 X10(6)/MCL (ref 4.7–6.1)
SODIUM SERPL-SCNC: 134 MMOL/L (ref 136–145)
TRIGL SERPL-MCNC: 110 MG/DL (ref 34–140)
VLDLC SERPL CALC-MCNC: 22 MG/DL
WBC # BLD AUTO: 4.38 X10(3)/MCL (ref 4.5–11.5)

## 2024-11-19 PROCEDURE — 80053 COMPREHEN METABOLIC PANEL: CPT

## 2024-11-19 PROCEDURE — 83036 HEMOGLOBIN GLYCOSYLATED A1C: CPT

## 2024-11-19 PROCEDURE — 82306 VITAMIN D 25 HYDROXY: CPT

## 2024-11-19 PROCEDURE — 36415 COLL VENOUS BLD VENIPUNCTURE: CPT

## 2024-11-19 PROCEDURE — 80061 LIPID PANEL: CPT

## 2024-11-19 PROCEDURE — 85025 COMPLETE CBC W/AUTO DIFF WBC: CPT

## 2024-11-27 ENCOUNTER — OFFICE VISIT (OUTPATIENT)
Dept: INTERNAL MEDICINE | Facility: CLINIC | Age: 44
End: 2024-11-27
Payer: MEDICAID

## 2024-11-27 DIAGNOSIS — N18.30 STAGE 3 CHRONIC KIDNEY DISEASE, UNSPECIFIED WHETHER STAGE 3A OR 3B CKD: ICD-10-CM

## 2024-11-27 DIAGNOSIS — F17.210 CIGARETTE NICOTINE DEPENDENCE WITHOUT COMPLICATION: ICD-10-CM

## 2024-11-27 DIAGNOSIS — E78.5 HYPERLIPIDEMIA, UNSPECIFIED HYPERLIPIDEMIA TYPE: ICD-10-CM

## 2024-11-27 DIAGNOSIS — D64.9 ANEMIA, UNSPECIFIED TYPE: ICD-10-CM

## 2024-11-27 DIAGNOSIS — E55.9 VITAMIN D DEFICIENCY: ICD-10-CM

## 2024-11-27 DIAGNOSIS — I10 HYPERTENSION, UNSPECIFIED TYPE: ICD-10-CM

## 2024-11-27 DIAGNOSIS — R73.03 PREDIABETES: ICD-10-CM

## 2024-11-27 RX ORDER — MICONAZOLE NITRATE 2 %
2 CREAM (GRAM) TOPICAL
Qty: 50 EACH | Refills: 6 | Status: SHIPPED | OUTPATIENT
Start: 2024-11-27

## 2024-11-27 RX ORDER — ACETAMINOPHEN 500 MG
2000 TABLET ORAL DAILY
Qty: 90 CAPSULE | Refills: 1 | Status: SHIPPED | OUTPATIENT
Start: 2024-11-27

## 2024-11-27 RX ORDER — AMLODIPINE BESYLATE 10 MG/1
10 TABLET ORAL DAILY
Qty: 90 TABLET | Refills: 1 | Status: SHIPPED | OUTPATIENT
Start: 2024-11-27 | End: 2025-11-27

## 2024-11-27 RX ORDER — DOCUSATE SODIUM 100 MG/1
100 CAPSULE, LIQUID FILLED ORAL 2 TIMES DAILY PRN
Qty: 60 CAPSULE | Refills: 6 | Status: SHIPPED | OUTPATIENT
Start: 2024-11-27

## 2024-11-27 RX ORDER — LISINOPRIL 20 MG/1
20 TABLET ORAL DAILY
Qty: 90 TABLET | Refills: 1 | Status: SHIPPED | OUTPATIENT
Start: 2024-11-27 | End: 2025-11-27

## 2024-11-27 RX ORDER — FERROUS SULFATE 324(65)MG
324 TABLET, DELAYED RELEASE (ENTERIC COATED) ORAL EVERY OTHER DAY
Qty: 45 TABLET | Refills: 1 | Status: SHIPPED | OUTPATIENT
Start: 2024-11-27

## 2024-11-27 NOTE — PROGRESS NOTES
The patient location is: home  The chief complaint leading to consultation is: labs    Visit type: audiovisual    Face to Face time with patient: 40  40 minutes of total time spent on the encounter, which includes face to face time and non-face to face time preparing to see the patient (eg, review of tests), Obtaining and/or reviewing separately obtained history, Documenting clinical information in the electronic or other health record, Independently interpreting results (not separately reported) and communicating results to the patient/family/caregiver, or Care coordination (not separately reported).         Each patient to whom he or she provides medical services by telemedicine is:  (1) informed of the relationship between the physician and patient and the respective role of any other health care provider with respect to management of the patient; and (2) notified that he or she may decline to receive medical services by telemedicine and may withdraw from such care at any time.    Notes: [facility]  BOUBACAR De Leon     Patient Name: Santiago Liriano   : 1980  MRN:[id number]     Chief Complaint     Chief Complaint   Patient presents with    Follow-up      4 month f/u for lab review     Labs Only        History of Present Illness     44 year old AAM, presents in clinic TM for lab f/u. Takes amlodipine 10 daily and lisinopril 20 daily. No acute complaints voiced.      PMH HTN, CKD, Anemia, Schizophrenia 2015, [place], obesity, tobacco use. Smokes 12 cigs/day (down to 4 cig/d) or 2 cigars per day. Stopped smoking 2023 but sinc restarted. BMI 42.  Follows NP Katerin Almeida Mental health provider every 3 months. Counselor is at [place] and meets every 2-3 weeks. On Invega, mood stable at this time. Lives alone, unemployed. Sleeping well. Appetite fair. Counting calories at home, eats 3-6 small meals, and trying to exercise 4-8 miles per day by walking.  Denies chest pain, shortness of  breath, cough, fever, headache, dizziness, weakness, abdominal pain, nausea, vomiting, diarrhea, constipation, dysuria, depression, anxiety.                Review of Systems     Review of Systems   Constitutional: Negative.  Negative for activity change and unexpected weight change.   HENT: Negative.  Negative for hearing loss, rhinorrhea and trouble swallowing.    Eyes: Negative.  Negative for discharge and visual disturbance.   Respiratory: Negative.  Negative for chest tightness and wheezing.    Cardiovascular: Negative.  Negative for chest pain and palpitations.   Gastrointestinal: Negative.  Negative for blood in stool, constipation, diarrhea and vomiting.   Endocrine: Negative.  Negative for polydipsia and polyuria.   Genitourinary: Negative.  Negative for difficulty urinating, hematuria and urgency.   Musculoskeletal: Negative.  Negative for arthralgias, joint swelling and neck pain.   Integumentary:  Negative.   Allergic/Immunologic: Negative.    Neurological: Negative.  Negative for weakness and headaches.   Hematological: Negative.    Psychiatric/Behavioral: Negative.  Negative for confusion and dysphoric mood.    All other systems reviewed and are negative.       Physical Examination     There were no vitals taken for this visit.     BP Readings from Last 6 Encounters:   07/24/24 126/72   07/22/24 119/78   03/20/24 138/82   02/21/24 (!) 132/90   12/18/23 114/78   08/16/23 122/80   ]    Wt Readings from Last 6 Encounters:   07/24/24 (!) 156 kg (343 lb 14.7 oz)   07/22/24 (!) 154.2 kg (340 lb)   03/20/24 (!) 153.8 kg (339 lb)   02/21/24 (!) 147.9 kg (326 lb)   12/18/23 (!) 143.8 kg (317 lb)   08/16/23 (!) 146.5 kg (323 lb)   ]    BMI Readings from Last 3 Encounters:   07/24/24 46.64 kg/m²   07/22/24 46.11 kg/m²   03/20/24 45.98 kg/m²         Physical Exam  Nursing note reviewed.   Neurological:      Mental Status: He is alert.   Psychiatric:         Mood and Affect: Mood normal.         Behavior: Behavior  normal.          Labs / Imaging     Chemistry:  Lab Results   Component Value Date     (L) 11/19/2024    K 4.1 11/19/2024    BUN 20.5 11/19/2024    CREATININE 1.53 (H) 11/19/2024    EGFRNORACEVR 57 11/19/2024    GLUCOSE 96 11/19/2024    CALCIUM 8.8 11/19/2024    ALKPHOS 79 11/19/2024    LABPROT 7.2 11/19/2024    ALBUMIN 3.8 11/19/2024    AST 18 11/19/2024    ALT 17 11/19/2024    [id number] 34 11/19/2024        Lab Results   Component Value Date    HGBA1C 5.8 11/19/2024        Hematology:  Lab Results   Component Value Date    WBC 4.38 (L) 11/19/2024    RBC 5.33 11/19/2024    HGB 12.4 (L) 11/19/2024    HCT 39.9 (L) 11/19/2024    MCV 74.9 (L) 11/19/2024    MCH 23.3 (L) 11/19/2024    MCHC 31.1 (L) 11/19/2024    RDW 16.4 11/19/2024     11/19/2024    MPV 11.5 (H) 11/19/2024        Lipid Panel:  Lab Results   Component Value Date    CHOL 161 11/19/2024    HDL 43 11/19/2024    LDL 96.00 11/19/2024    TRIG 110 11/19/2024    TOTALCHOLEST 4 11/19/2024        Urine:  Lab Results   Component Value Date    APPEARANCEUA Clear 07/18/2024    SGUA 1.015 07/18/2024    PROTEINUA Negative 07/18/2024    KETONESUA Negative 07/18/2024    LEUKOCYTESUR Negative 07/18/2024    RBCUA 0-5 07/18/2024    WBCUA 0-5 07/18/2024    BACTERIA None Seen 07/18/2024    SQEPUA None Seen 07/18/2024    HYALINECASTS None Seen 07/18/2024    CREATRANDUR 133.7 07/18/2024    PROTEINURINE 10.4 07/18/2024    UPROTCREA 0.1 07/18/2024          Assessment       ICD-10-CM ICD-9-CM   1. Hypertension, unspecified type  I10 401.9   2. Prediabetes  R73.03 790.29   3. Vitamin D deficiency  E55.9 268.9   4. Stage 3 chronic kidney disease, unspecified whether stage 3a or 3b CKD  N18.30 585.3   5. Hyperlipidemia, unspecified hyperlipidemia type  E78.5 272.4   6. Cigarette nicotine dependence without complication  F17.210 305.1   7. Anemia, unspecified type  D64.9 285.9        Plan     1. Hypertension, unspecified type  Med refills. DASH diet: Eat more fruits,  vegetables, and low fat dairy foods.  (Less than 2 grams of sodium per day).  Maintain healthy weight with goal BMI <30.   Exercise 30 minutes per day 5 days per week.  Home medications refilled and continued.   Home BP monitoring encouraged with BP parameters given.    - lisinopriL (PRINIVIL,ZESTRIL) 20 MG tablet; Take 1 tablet (20 mg total) by mouth once daily.  Dispense: 90 tablet; Refill: 1  - amLODIPine (NORVASC) 10 MG tablet; Take 1 tablet (10 mg total) by mouth once daily.  Dispense: 90 tablet; Refill: 1    2. Prediabetes  A1C 5.8 improved  Prediabetes is reversible. Close follow up is important.  Maintain healthy weight or lose weight.   Eat fewer refined carbohydrates and fats, and more fiber.  Read nutrition labels.  Reduce portion sizes.  Eat out less often. Avoid fast foods.  Drink water and unsweetened beverages.  Spending at least one hour every day in physical activity.     3. Vitamin D deficiency  Vitamin D level reviewed and is currently at goal, between 30-80 ng/mL. Continue OTC Vitamin D3 2000 IU daily.     4. Stage 3 chronic kidney disease, unspecified whether stage 3a or 3b CKD  Lab Results   Component Value Date    EGFRNORACEVR 57 11/19/2024    EGFRNORACEVR 58 07/18/2024     stable  Follow renoprotective measures including Renal Diet (reduce intake of nuts, peanut butter, milk, cheese, dried beans, peas) and Low Sodium Diet (less than 2 grams per day).  Avoid NSAIDs (Aleve, Mobic, Celebrex, Ibuprofen, Advil, Toradol and Diclofenac). May take Tylenol as needed for headache/pain.  Control DM with goal A1C <7. BP goal <130/80. LDL goal < 100.  Stay well hydrated. Avoid alcohol and soda. Limit tea and coffee.  Smoking Cessation recommended.     5. Hyperlipidemia, unspecified hyperlipidemia type  Lab Results   Component Value Date    LDL 96.00 11/19/2024    CHOL 161 11/19/2024    HDL 43 11/19/2024    TRIG 110 11/19/2024       Monitoring. Take Omega 3 daily.   Stressed importance of dietary  modifications. Follow a low cholesterol, low saturated fat diet with less that 200mg of cholesterol a day.  Avoid fried foods and high saturated fats (high saturated fats less than 7% of calories).  Add Flax Seed/Fish Oil supplements to diet. Increase dietary fiber.  Regular exercise can reduce LDL and raise HDL. Stressed importance of physical activity 5 times per week for 30 minutes per day.      6. Cigarette nicotine dependence without complication  Smoking cessation advised. Instructed on smoking cessation program through [facility] and pharmacological interventions to aid in cessation.  >5 minutes allotted to educate patient on the harms of smoking, the urgency to quit, and the development of a plan for smoking cessation.   - nicotine, polacrilex, (NICORETTE) 2 mg Gum; Take 1 each (2 mg total) by mouth every 2 (two) hours as needed (smok cessation).  Dispense: 50 each; Refill: 6    7. Anemia, unspecified type  Lab Results   Component Value Date    HCT 39.9 (L) 11/19/2024    HGB 12.4 (L) 11/19/2024     Take iron supplements as prescribed.  Add Vitamin C to your diet.  Take iron and vitamin C on an empty stomach for better absorption if tolerated.  Add iron rich foods to diet such as liver, lean beef, eggs, dried fruit, dark green leafy vegetables.  Education provided on additional sources of iron-rich foods.  Do NOT drink milk or take antacids at the same time you take your iron supplement.  Iron supplements can cause constipation; add stool softener or fiber as needed.   - ferrous sulfate 324 mg (65 mg iron) TbEC; Take 1 tablet (324 mg total) by mouth every other day.  Dispense: 45 tablet; Refill: 1        Current Outpatient Medications   Medication Instructions    amLODIPine (NORVASC) 10 mg, Oral, Daily    cholecalciferol (vitamin D3) (VITAMIN D3) 2,000 Units, Oral, Daily    docusate sodium (COLACE) 100 mg, Oral, 2 times daily PRN    ferrous sulfate 324 mg, Oral, Every other day    INVEGA SUSTENNA 156 mg/mL Syrg  injection Inject into the muscle.    lisinopriL (PRINIVIL,ZESTRIL) 20 mg, Oral, Daily    nicotine (polacrilex) (NICORETTE) 2 mg, Oral, Every 2 hours PRN       Orders Placed This Encounter   Procedures    CBC Auto Differential    Comprehensive Metabolic Panel    Lipid Panel    Hemoglobin A1C    Vitamin D    Urinalysis, Reflex to Urine Culture         Future Appointments   Date Time Provider Department Center   1/27/2025 12:45 PM Shyann Robles FNP [facility] NE[facility]        Follow up in about 6 months (around 5/27/2025) for fasting labs.    Labs thoroughly reviewed with patient. Medication refills addressed today.  RTC prn and 6 months, with labs 1 week prior to the apt.  COVID 19 precautions given to patient.  Patient voices understanding of all discharge instructions.      BOUBACAR De Leon

## 2025-01-27 ENCOUNTER — LAB VISIT (OUTPATIENT)
Dept: LAB | Facility: HOSPITAL | Age: 45
End: 2025-01-27
Attending: NURSE PRACTITIONER
Payer: MEDICAID

## 2025-01-27 ENCOUNTER — OFFICE VISIT (OUTPATIENT)
Dept: NEPHROLOGY | Facility: CLINIC | Age: 45
End: 2025-01-27
Payer: MEDICAID

## 2025-01-27 VITALS
TEMPERATURE: 99 F | WEIGHT: 315 LBS | RESPIRATION RATE: 18 BRPM | BODY MASS INDEX: 42.66 KG/M2 | HEIGHT: 72 IN | DIASTOLIC BLOOD PRESSURE: 96 MMHG | SYSTOLIC BLOOD PRESSURE: 156 MMHG | OXYGEN SATURATION: 97 % | HEART RATE: 90 BPM

## 2025-01-27 DIAGNOSIS — N18.31 CKD STAGE G3A/A1, GFR 45-59 AND ALBUMIN CREATININE RATIO <30 MG/G: ICD-10-CM

## 2025-01-27 DIAGNOSIS — Z72.0 TOBACCO ABUSE: ICD-10-CM

## 2025-01-27 DIAGNOSIS — I10 PRIMARY HYPERTENSION: ICD-10-CM

## 2025-01-27 DIAGNOSIS — N18.31 CKD STAGE G3A/A1, GFR 45-59 AND ALBUMIN CREATININE RATIO <30 MG/G: Primary | ICD-10-CM

## 2025-01-27 DIAGNOSIS — E66.01 SEVERE OBESITY (BMI >= 40): ICD-10-CM

## 2025-01-27 LAB
ALBUMIN SERPL-MCNC: 3.9 G/DL (ref 3.5–5)
ALBUMIN/GLOB SERPL: 1 RATIO (ref 1.1–2)
ALP SERPL-CCNC: 73 UNIT/L (ref 40–150)
ALT SERPL-CCNC: 21 UNIT/L (ref 0–55)
ANION GAP SERPL CALC-SCNC: 8 MEQ/L
AST SERPL-CCNC: 15 UNIT/L (ref 5–34)
BACTERIA #/AREA URNS AUTO: ABNORMAL /HPF
BILIRUB SERPL-MCNC: 0.3 MG/DL
BILIRUB UR QL STRIP.AUTO: NEGATIVE
BUN SERPL-MCNC: 17.6 MG/DL (ref 8.9–20.6)
CALCIUM SERPL-MCNC: 9.1 MG/DL (ref 8.4–10.2)
CHLORIDE SERPL-SCNC: 109 MMOL/L (ref 98–107)
CLARITY UR: CLEAR
CO2 SERPL-SCNC: 20 MMOL/L (ref 22–29)
COLOR UR AUTO: ABNORMAL
CREAT SERPL-MCNC: 1.46 MG/DL (ref 0.72–1.25)
CREAT UR-MCNC: 225.1 MG/DL (ref 63–166)
CREAT/UREA NIT SERPL: 12
GFR SERPLBLD CREATININE-BSD FMLA CKD-EPI: 60 ML/MIN/1.73/M2
GLOBULIN SER-MCNC: 4 GM/DL (ref 2.4–3.5)
GLUCOSE SERPL-MCNC: 112 MG/DL (ref 74–100)
GLUCOSE UR QL STRIP: NORMAL
HGB UR QL STRIP: NEGATIVE
HYALINE CASTS #/AREA URNS LPF: ABNORMAL /LPF
KETONES UR QL STRIP: NEGATIVE
LEUKOCYTE ESTERASE UR QL STRIP: NEGATIVE
MUCOUS THREADS URNS QL MICRO: ABNORMAL /LPF
NITRITE UR QL STRIP: NEGATIVE
PH UR STRIP: 5.5 [PH]
POTASSIUM SERPL-SCNC: 3.8 MMOL/L (ref 3.5–5.1)
PROT SERPL-MCNC: 7.9 GM/DL (ref 6.4–8.3)
PROT UR QL STRIP: ABNORMAL
PROT UR STRIP-MCNC: 22.9 MG/DL
RBC #/AREA URNS AUTO: ABNORMAL /HPF
SODIUM SERPL-SCNC: 137 MMOL/L (ref 136–145)
SP GR UR STRIP.AUTO: 1.02 (ref 1–1.03)
SQUAMOUS #/AREA URNS LPF: ABNORMAL /HPF
URINE PROTEIN/CREATININE RATIO (OLG): 0.1
UROBILINOGEN UR STRIP-ACNC: NORMAL
WBC #/AREA URNS AUTO: ABNORMAL /HPF

## 2025-01-27 PROCEDURE — 82570 ASSAY OF URINE CREATININE: CPT

## 2025-01-27 PROCEDURE — 81001 URINALYSIS AUTO W/SCOPE: CPT

## 2025-01-27 PROCEDURE — 1159F MED LIST DOCD IN RCRD: CPT | Mod: CPTII,,, | Performed by: NURSE PRACTITIONER

## 2025-01-27 PROCEDURE — 3008F BODY MASS INDEX DOCD: CPT | Mod: CPTII,,, | Performed by: NURSE PRACTITIONER

## 2025-01-27 PROCEDURE — 80053 COMPREHEN METABOLIC PANEL: CPT

## 2025-01-27 PROCEDURE — 3077F SYST BP >= 140 MM HG: CPT | Mod: CPTII,,, | Performed by: NURSE PRACTITIONER

## 2025-01-27 PROCEDURE — 99214 OFFICE O/P EST MOD 30 MIN: CPT | Mod: S$PBB,,, | Performed by: NURSE PRACTITIONER

## 2025-01-27 PROCEDURE — 1160F RVW MEDS BY RX/DR IN RCRD: CPT | Mod: CPTII,,, | Performed by: NURSE PRACTITIONER

## 2025-01-27 PROCEDURE — 99215 OFFICE O/P EST HI 40 MIN: CPT | Mod: PBBFAC | Performed by: NURSE PRACTITIONER

## 2025-01-27 PROCEDURE — 4010F ACE/ARB THERAPY RXD/TAKEN: CPT | Mod: CPTII,,, | Performed by: NURSE PRACTITIONER

## 2025-01-27 PROCEDURE — 3066F NEPHROPATHY DOC TX: CPT | Mod: CPTII,,, | Performed by: NURSE PRACTITIONER

## 2025-01-27 PROCEDURE — 84156 ASSAY OF PROTEIN URINE: CPT

## 2025-01-27 PROCEDURE — 3080F DIAST BP >= 90 MM HG: CPT | Mod: CPTII,,, | Performed by: NURSE PRACTITIONER

## 2025-01-27 PROCEDURE — 36415 COLL VENOUS BLD VENIPUNCTURE: CPT

## 2025-01-27 RX ORDER — PALIPERIDONE PALMITATE 546 MG/1.75ML
546 INJECTION, SUSPENSION, EXTENDED RELEASE INTRAMUSCULAR
COMMUNITY
Start: 2024-11-07

## 2025-01-27 NOTE — PROGRESS NOTES
"[facility]  Nephrology Clinic Note    Chief complaint: Follow-up (RTC, took meds, blood sugar concerns)    History of present illness:   Santiago Liriano is a 44 y.o. White male with past medical history of hypertension, chronic kidney disease, anemia, schizophrenia, aortic regurgitation, IGT, obesity, and past history of tobacco abuse.   Patient presents for follow-up appointment in Nephrology Clinic today.  Denies complaints.    States had he did not sleep well last night and feels stressed out today.      Review of Systems  12 point review of systems conducted, negative except as stated in the history of present illness.    Allergies: Patient has No Known Allergies.     Past Medical History:  has a past medical history of Hypertension.    Procedure History:  has a past surgical history that includes No Surgery history.    Family History: family history is not on file.    Social History:  reports that he has been smoking cigarettes and cigars. He started smoking about 7 years ago. He has a 1.5 pack-year smoking history. He has been exposed to tobacco smoke. He uses smokeless tobacco. He reports current alcohol use of about 1.0 standard drink of alcohol per week. He reports that he does not currently use drugs.    Physical exam  BP (!) 156/96 (BP Location: Left arm, Patient Position: Sitting)   Pulse 90   Temp 98.6 °F (37 °C) (Oral)   Resp 18   Ht 5' 11.65" (1.82 m)   Wt (!) 161.7 kg (356 lb 7.7 oz)   SpO2 97%   BMI 48.82 kg/m²   General appearance: Patient is in no acute distress.  Skin: No rashes or wounds.  HEENT: PERRLA, EOMI, no scleral icterus, no JVD. Neck is supple.  Chest: Respirations are unlabored. Lungs sounds are clear.   Heart: S1, S2.   Abdomen: Benign.  : Deferred.  Extremities: No edema, peripheral pulses are palpable.   Neuro: No focal deficits.     Home Medications:    Current Outpatient Medications:     amLODIPine (NORVASC) 10 MG tablet, Take 1 tablet " (10 mg total) by mouth once daily., Disp: 90 tablet, Rfl: 1    cholecalciferol, vitamin D3, (VITAMIN D3) 50 mcg (2,000 unit) Cap capsule, Take 1 capsule (2,000 Units total) by mouth once daily., Disp: 90 capsule, Rfl: 1    docusate sodium (COLACE) 100 MG capsule, Take 1 capsule (100 mg total) by mouth 2 (two) times daily as needed for Constipation., Disp: 60 capsule, Rfl: 6    ferrous sulfate 324 mg (65 mg iron) TbEC, Take 1 tablet (324 mg total) by mouth every other day., Disp: 45 tablet, Rfl: 1    INVEGA TRINZA 546 mg/1.75 mL Syrg injection, Inject 546 mg into the muscle every 3 (three) months., Disp: , Rfl:     lisinopriL (PRINIVIL,ZESTRIL) 20 MG tablet, Take 1 tablet (20 mg total) by mouth once daily., Disp: 90 tablet, Rfl: 1    nicotine, polacrilex, (NICORETTE) 2 mg Gum, Take 1 each (2 mg total) by mouth every 2 (two) hours as needed (smok cessation)., Disp: 50 each, Rfl: 6    INVEGA SUSTENNA 156 mg/mL Syrg injection, Inject into the muscle., Disp: , Rfl:     Laboratory data    Lab Results   Component Value Date    WBC 4.38 (L) 11/19/2024    HGB 12.4 (L) 11/19/2024    HCT 39.9 (L) 11/19/2024     11/19/2024     (L) 11/19/2024    K 4.1 11/19/2024    CO2 23 11/19/2024    BUN 20.5 11/19/2024    CREATININE 1.53 (H) 11/19/2024    EGFRNORACEVR 57 11/19/2024    GLUCOSE 96 11/19/2024    CALCIUM 8.8 11/19/2024    ALKPHOS 79 11/19/2024    LABPROT 7.2 11/19/2024    ALBUMIN 3.8 11/19/2024    AST 18 11/19/2024    ALT 17 11/19/2024      Lab Results   Component Value Date    HGBA1C 5.8 11/19/2024    [id number] 34 11/19/2024    HIV Nonreactive 07/19/2023    HEPCAB Nonreactive 07/19/2023     Urine:  Lab Results   Component Value Date    APPEARANCEUA Clear 07/18/2024    SGUA 1.015 07/18/2024    PROTEINUA Negative 07/18/2024    KETONESUA Negative 07/18/2024    LEUKOCYTESUR Negative 07/18/2024    RBCUA 0-5 07/18/2024    WBCUA 0-5 07/18/2024    BACTERIA None Seen 07/18/2024    SQEPUA None Seen 07/18/2024     HYALINECASTS None Seen 07/18/2024    CREATRANDUR 133.7 07/18/2024    PROTEINURINE 10.4 07/18/2024    UPROTCREA 0.1 07/18/2024         Imaging  US Retroperitoneal Limited 08/07/2024  Right kidney measures 11.4 cm. Left kidney measures 10.6 cm.  No hydronephrosis.  Nonshadowing echogenic focus in the mid left kidney measures 0.6 cm.  Renal echogenicity is normal.  Impression  1. Probable nonobstructing left renal calculus.  Electronically signed by: Jaylan Lizarraga MD  Date:    08/07/2024  Time:    08:22    Impression    ICD-10-CM ICD-9-CM   1. CKD stage G3a/A1, GFR 45-59 and albumin creatinine ratio <30 mg/g  N18.31 585.3   2. Primary hypertension  I10 401.9   3. Severe obesity (BMI >= 40)  E66.01 278.01   4. Tobacco abuse  Z72.0 305.1        Plan  CKD stage G3a/A1, GFR 45-59 and albumin creatinine ratio <30 mg/g  -     Comprehensive Metabolic Panel; Future; Expected date: 05/10/2025  -     Protein/Creatinine Ratio, Urine; Future; Expected date: 05/10/2025  -     Urinalysis, Reflex to Urine Culture; Future; Expected date: 05/10/2025  Probably hypertensive kidney disease.  Renal indices have been relatively stable since 2022, there is no significant proteinuria or active urinary sediment.  Kidney ultrasound was unremarkable.  Continue renal sparing activities:  -2 g a day dietary sodium restriction  -control high blood pressure (goal blood pressure is less than 130/80, patient was advised to check blood pressure once or twice a week and bring blood pressure logs to next office visit)  -exercise at least 30 minutes a day, 5 days a week  -maintain healthy weight  -decrease or stop alcohol use  -do not smoke  -stay well hydrated (drink water only, avoid juices, sweet tea, and sodas)  -ask about staying up-to-date on vaccinations (flu vaccine, pneumonia vaccine, hepatitis B vaccine)  -avoid excessive use of NSAIDs (ibuprofen, naproxen, Aleve, Advil, Toradol, Mobic), take Tylenol as needed for headache or mild pain  -take  cholesterol lowering medications if prescribed (LDL goal less than 100)    Primary hypertension  Blood pressure readings are usually at goal.  Patient was advised to continue home blood pressure monitoring and call clinic with blood pressure logs in about a week.    If home blood pressure readings are persistently elevated above 140/80, will increase lisinopril dose to 40 mg a day.      Severe obesity (BMI >= 40)  Lifestyle and dietary interventions discussed, patient encouraged to maintain non-sedentary lifestyle and well-balanced diet.     Tobacco abuse  Patient was counseled on importance of tobacco use cessation.  Strongly encouraged to quit, offered a referral to a smoking cessation program.      Follow up in about 4 months (around 5/27/2025).         Shyann Robles NP  [facility] Nephrology

## 2025-05-19 ENCOUNTER — LAB VISIT (OUTPATIENT)
Dept: LAB | Facility: HOSPITAL | Age: 45
End: 2025-05-19
Attending: NURSE PRACTITIONER
Payer: MEDICAID

## 2025-05-19 DIAGNOSIS — E78.5 HYPERLIPIDEMIA, UNSPECIFIED HYPERLIPIDEMIA TYPE: ICD-10-CM

## 2025-05-19 DIAGNOSIS — R73.03 PREDIABETES: ICD-10-CM

## 2025-05-19 DIAGNOSIS — I10 HYPERTENSION, UNSPECIFIED TYPE: ICD-10-CM

## 2025-05-19 DIAGNOSIS — E55.9 VITAMIN D DEFICIENCY: ICD-10-CM

## 2025-05-19 LAB
25(OH)D3+25(OH)D2 SERPL-MCNC: 37 NG/ML (ref 30–80)
ALBUMIN SERPL-MCNC: 3.9 G/DL (ref 3.5–5)
ALBUMIN/GLOB SERPL: 1 RATIO (ref 1.1–2)
ALP SERPL-CCNC: 71 UNIT/L (ref 40–150)
ALT SERPL-CCNC: 21 UNIT/L (ref 0–55)
ANION GAP SERPL CALC-SCNC: 7 MEQ/L
AST SERPL-CCNC: 19 UNIT/L (ref 11–45)
BACTERIA #/AREA URNS AUTO: ABNORMAL /HPF
BASOPHILS # BLD AUTO: 0.02 X10(3)/MCL
BASOPHILS NFR BLD AUTO: 0.5 %
BILIRUB SERPL-MCNC: 0.6 MG/DL
BILIRUB UR QL STRIP.AUTO: NEGATIVE
BUN SERPL-MCNC: 17.3 MG/DL (ref 8.9–20.6)
CALCIUM SERPL-MCNC: 8.8 MG/DL (ref 8.4–10.2)
CHLORIDE SERPL-SCNC: 107 MMOL/L (ref 98–107)
CHOLEST SERPL-MCNC: 167 MG/DL
CHOLEST/HDLC SERPL: 4 {RATIO} (ref 0–5)
CLARITY UR: CLEAR
CO2 SERPL-SCNC: 23 MMOL/L (ref 22–29)
COLOR UR AUTO: ABNORMAL
CREAT SERPL-MCNC: 1.39 MG/DL (ref 0.72–1.25)
CREAT/UREA NIT SERPL: 12
EOSINOPHIL # BLD AUTO: 0.08 X10(3)/MCL (ref 0–0.9)
EOSINOPHIL NFR BLD AUTO: 1.8 %
ERYTHROCYTE [DISTWIDTH] IN BLOOD BY AUTOMATED COUNT: 16.5 % (ref 11.5–17)
EST. AVERAGE GLUCOSE BLD GHB EST-MCNC: 131.2 MG/DL
GFR SERPLBLD CREATININE-BSD FMLA CKD-EPI: >60 ML/MIN/1.73/M2
GLOBULIN SER-MCNC: 4.1 GM/DL (ref 2.4–3.5)
GLUCOSE SERPL-MCNC: 99 MG/DL (ref 74–100)
GLUCOSE UR QL STRIP: NORMAL
HBA1C MFR BLD: 6.2 %
HCT VFR BLD AUTO: 42.3 % (ref 42–52)
HDLC SERPL-MCNC: 44 MG/DL (ref 35–60)
HGB BLD-MCNC: 12.7 G/DL (ref 14–18)
HGB UR QL STRIP: NEGATIVE
HYALINE CASTS #/AREA URNS LPF: ABNORMAL /LPF
IMM GRANULOCYTES # BLD AUTO: 0.01 X10(3)/MCL (ref 0–0.04)
IMM GRANULOCYTES NFR BLD AUTO: 0.2 %
KETONES UR QL STRIP: NEGATIVE
LDLC SERPL CALC-MCNC: 105 MG/DL (ref 50–140)
LEUKOCYTE ESTERASE UR QL STRIP: NEGATIVE
LYMPHOCYTES # BLD AUTO: 1.54 X10(3)/MCL (ref 0.6–4.6)
LYMPHOCYTES NFR BLD AUTO: 35 %
MCH RBC QN AUTO: 22.6 PG (ref 27–31)
MCHC RBC AUTO-ENTMCNC: 30 G/DL (ref 33–36)
MCV RBC AUTO: 75.3 FL (ref 80–94)
MONOCYTES # BLD AUTO: 0.56 X10(3)/MCL (ref 0.1–1.3)
MONOCYTES NFR BLD AUTO: 12.7 %
MUCOUS THREADS URNS QL MICRO: ABNORMAL /LPF
NEUTROPHILS # BLD AUTO: 2.19 X10(3)/MCL (ref 2.1–9.2)
NEUTROPHILS NFR BLD AUTO: 49.8 %
NITRITE UR QL STRIP: NEGATIVE
NRBC BLD AUTO-RTO: 0 %
PH UR STRIP: 6.5 [PH]
PLATELET # BLD AUTO: 194 X10(3)/MCL (ref 130–400)
PLATELETS.RETICULATED NFR BLD AUTO: 5.3 % (ref 0.9–11.2)
PMV BLD AUTO: 10.7 FL (ref 7.4–10.4)
POTASSIUM SERPL-SCNC: 4 MMOL/L (ref 3.5–5.1)
PROT SERPL-MCNC: 8 GM/DL (ref 6.4–8.3)
PROT UR QL STRIP: NEGATIVE
RBC # BLD AUTO: 5.62 X10(6)/MCL (ref 4.7–6.1)
RBC #/AREA URNS AUTO: ABNORMAL /HPF
SODIUM SERPL-SCNC: 137 MMOL/L (ref 136–145)
SP GR UR STRIP.AUTO: 1.01 (ref 1–1.03)
SQUAMOUS #/AREA URNS LPF: ABNORMAL /HPF
TRIGL SERPL-MCNC: 90 MG/DL (ref 34–140)
UROBILINOGEN UR STRIP-ACNC: NORMAL
VLDLC SERPL CALC-MCNC: 18 MG/DL
WBC # BLD AUTO: 4.4 X10(3)/MCL (ref 4.5–11.5)
WBC #/AREA URNS AUTO: ABNORMAL /HPF

## 2025-05-19 PROCEDURE — 82306 VITAMIN D 25 HYDROXY: CPT

## 2025-05-19 PROCEDURE — 36415 COLL VENOUS BLD VENIPUNCTURE: CPT

## 2025-05-19 PROCEDURE — 85025 COMPLETE CBC W/AUTO DIFF WBC: CPT

## 2025-05-19 PROCEDURE — 80053 COMPREHEN METABOLIC PANEL: CPT

## 2025-05-19 PROCEDURE — 81001 URINALYSIS AUTO W/SCOPE: CPT

## 2025-05-19 PROCEDURE — 80061 LIPID PANEL: CPT

## 2025-05-19 PROCEDURE — 83036 HEMOGLOBIN GLYCOSYLATED A1C: CPT

## 2025-05-23 ENCOUNTER — E-VISIT (OUTPATIENT)
Dept: INTERNAL MEDICINE | Facility: CLINIC | Age: 45
End: 2025-05-23
Payer: MEDICAID

## 2025-05-23 DIAGNOSIS — J30.9 ALLERGIC RHINITIS, UNSPECIFIED SEASONALITY, UNSPECIFIED TRIGGER: Primary | ICD-10-CM

## 2025-05-24 RX ORDER — LORATADINE 10 MG/1
10 TABLET ORAL DAILY
Qty: 90 TABLET | Refills: 3 | Status: SHIPPED | OUTPATIENT
Start: 2025-05-24 | End: 2026-05-24

## 2025-05-24 NOTE — PROGRESS NOTES
Patient ID: Santiago Liriano is a 44 y.o. male.        E-Visit Time Tracking:             Chief Complaint: General Illness (Entered automatically based on patient selection in [employer].)      The patient initiated a request through [employer] on 5/23/2025 for evaluation and management with a chief complaint of General Illness (Entered automatically based on patient selection in [employer].)     I evaluated the questionnaire submission on 05/24/2025.    [facility] Peq Evisit Supergroup-Cough And Cold    5/23/2025  4:58 PM CDT - Filed by Patient   What do you need help with? Allergies   Do you agree to participate in an E-Visit? Yes   If you have any of the following symptoms, go to your local emergency room or call 911: I acknowledge   What is the main issue you would like addressed today? Allergies, congestion   Do you think you might have COVID-19 or the Flu? No   What symptoms do you currently have?  Cough;  Stuffy nose   Describe your cough: Contains mucus;  Comes and goes   Describe your mucus: Clear;  White;  Foamy;  Thick;  Thin   Have you ever smoked? Smoked in the past   Do you have a fever? No   When did your concern begin? 4/14/2025   In the last two weeks, have you been in close contact with someone who has COVID-19, the Flu, or strep throat? No   What have you tried to help your symptoms? Drinking more fluids;  Hot shower;  Rest and sleep;  Vitamin C   On a scale of 1-10, where 10 is the worst you can imagine, how severe are your symptoms? (range: 1 - 10) 7   How have your symptoms changed since they first started? No change   Do you have transportation to an [facility] location to get tested for COVID-19? No   Provide any additional information you feel is important. Is it possible to get prescribed Loratadine? It seems like allergies.   Please attach any relevant images or files    Are you able to take your vital signs? No         No diagnosis found.     No orders of the defined types were placed in this  encounter.           No follow-ups on file.

## 2025-05-26 ENCOUNTER — PATIENT MESSAGE (OUTPATIENT)
Dept: INTERNAL MEDICINE | Facility: CLINIC | Age: 45
End: 2025-05-26
Payer: MEDICAID

## 2025-05-27 ENCOUNTER — TELEPHONE (OUTPATIENT)
Dept: INTERNAL MEDICINE | Facility: CLINIC | Age: 45
End: 2025-05-27
Payer: MEDICAID

## 2025-05-27 NOTE — TELEPHONE ENCOUNTER
Spoke to pt. Pt states he experiencing a nasal drip and some congestion . Says he tried over the counter medications, but could not remember if it was Mucinex or what type of medication it was. Advised pt if symptoms worsens or persist to visit. Pt voiced understanding and request Rx for nasal drip and congestion . Pt denies fever and or sore throat at this time. Says he has a slight cough at this time due to the mucous. Please advise.

## 2025-05-27 NOTE — PROGRESS NOTES
[facility]  Nephrology Clinic    Patient ID: [id number]     Chief Complaint: Follow-up for Chronic Kidney Disease      HPI:     Santiago Liriano is a 44 y.o.  male who presents to nephrology clinic for management of Chronic Kidney Disease. Patient has pertinent chronic conditions that include hypertension, anemia, schizophrenia, aortic regurgitation, IGT, obesity, and past history of tobacco abuse.     Past Medical History:   Diagnosis Date    Hypertension         Past Surgical History:   Procedure Laterality Date    No Surgery history          Social History     Tobacco Use    Smoking status: Some Days     Current packs/day: 0.25     Average packs/day: 0.3 packs/day for 6.5 years (1.6 ttl pk-yrs)     Types: Cigarettes, Cigars     Start date: 1/1/2018     Last attempt to quit: 4/10/2023     Passive exposure: Yes    Smokeless tobacco: Current   Vaping Use    Vaping status: Never Used   Substance and Sexual Activity    Alcohol use: Yes     Alcohol/week: 1.0 standard drink of alcohol     Types: 1 Shots of liquor per week     Comment: occas    Drug use: Not Currently    Sexual activity: Not on file        Current Outpatient Medications   Medication Instructions    amLODIPine (NORVASC) 10 mg, Oral, Daily    cholecalciferol (vitamin D3) (VITAMIN D3) 2,000 Units, Oral, Daily    docusate sodium (COLACE) 100 mg, Oral, 2 times daily PRN    ferrous sulfate 324 mg, Oral, Every other day    INVEGA SUSTENNA 156 mg/mL Syrg injection Inject into the muscle.    INVEGA TRINZA 546 mg, Every 3 months    lisinopriL (PRINIVIL,ZESTRIL) 40 mg, Oral, Daily    loratadine (CLARITIN) 10 mg, Oral, Daily    metoprolol succinate (TOPROL-XL) 50 mg, Oral, Daily    nicotine (polacrilex) (NICORETTE) 2 mg, Oral, Every 2 hours PRN       Review of patient's allergies indicates:  No Known Allergies       Review of Systems:  12 point review of systems conducted, negative except as stated in the history of present  "illness. See HPI for details.    Physical Exam:  Visit Vitals  BP (!) 176/110   Pulse 93   Temp 98 °F (36.7 °C) (Oral)   Resp 18   Ht 5' 11.65" (1.82 m)   Wt (!) 173 kg (381 lb 6.3 oz)   SpO2 98%   BMI 52.23 kg/m²     General appearance: Patient is in no acute distress.  Skin: No rashes or wounds.  HEENT: PERRLA, EOMI, no scleral icterus, no JVD. Neck is supple.  Chest: Respirations are unlabored. Lungs sounds are clear.   Heart: S1, S2.   Abdomen: Benign.  : Deferred.  Extremities: No edema, peripheral pulses are palpable.   Neuro: No focal deficits.     Laboratory Data:    Lab Results   Component Value Date    WBC 4.40 (L) 05/19/2025    HGB 12.7 (L) 05/19/2025    HCT 42.3 05/19/2025     05/19/2025     05/19/2025    K 4.0 05/19/2025    CO2 23 05/19/2025    BUN 17.3 05/19/2025    CREATININE 1.39 (H) 05/19/2025    EGFRNORACEVR >60 05/19/2025    CALCIUM 8.8 05/19/2025    ALKPHOS 71 05/19/2025    ALBUMIN 3.9 05/19/2025    AST 19 05/19/2025    ALT 21 05/19/2025      Lab Results   Component Value Date    HGBA1C 6.2 05/19/2025    [id number] 37 05/19/2025    HIV Nonreactive 07/19/2023    HEPCAB Nonreactive 07/19/2023     Urine:  Lab Results   Component Value Date    APPEARANCEUA Clear 05/19/2025    SGUA 1.012 05/19/2025    PROTEINUA Negative 05/19/2025    KETONESUA Negative 05/19/2025    LEUKOCYTESUR Negative 05/19/2025    RBCUA 0-5 05/19/2025    WBCUA 0-5 05/19/2025    BACTERIA None Seen 05/19/2025    SQEPUA None Seen 05/19/2025    HYALINECASTS None Seen 05/19/2025    CREATRANDUR 225.1 (H) 01/27/2025    PROTEINURINE 22.9 01/27/2025    UPROTCREA 0.1 01/27/2025         Imaging:  US RETROPERITONEAL LIMITED     CLINICAL HISTORY:  CKD stage 3     COMPARISON:  None.     FINDINGS:  Right kidney measures 11.4 cm. Left kidney measures 10.6 cm.  No hydronephrosis.  Nonshadowing echogenic focus in the mid left kidney measures 0.6 cm.  Renal echogenicity is normal.     Impression:     1. Probable nonobstructing left " renal calculus.        Electronically signed by:Jaylan Lizarraga MD  Date:                                            08/07/2024  Time:                                           08:22    Impression:    ICD-10-CM ICD-9-CM   1. CKD stage G2/A2, GFR 60-89 and albumin creatinine ratio  mg/g  N18.2 585.2   2. Primary hypertension  I10 401.9   3. Tobacco abuse  Z72.0 305.1   4. Severe obesity (BMI >= 40)  E66.01 278.01        Plan:  1. CKD stage G2/A2, GFR 60-89 and albumin creatinine ratio  mg/g  Probably hypertensive kidney disease.  Renal indices are stable, there is no significant proteinuria or active urinary sediment.  Kidney ultrasound was unremarkable.  Continue renal sparing activities:  -2 g a day dietary sodium restriction  -control high blood pressure (goal blood pressure is less than 130/80   -exercise at least 30 minutes a day, 5 days a week  -maintain healthy weight  -decrease or stop alcohol use  -do not smoke  -stay well hydrated (drink water only, avoid juices, sweet tea, and sodas)  -ask about staying up-to-date on vaccinations (flu vaccine, pneumonia vaccine, hepatitis B vaccine)  -avoid excessive use of NSAIDs (ibuprofen, naproxen, Aleve, Advil, Toradol, Mobic), take Tylenol as needed for headache or mild pain  -take cholesterol lowering medications if prescribed (LDL goal less than 100)    2. Primary Hypertension  Blood pressure is not within goal. Will increase lisinopril to 40mg daily and add metoprolol 50mg succinate XL daily. Patient to return to clinic in 3 weeks for a blood pressure check.  Continue to adhere to 2g a day dietary sodium restriction.     3. Tobacco abuse  Patient was counseled on importance of tobacco use cessation.  Strongly encouraged to quit, offered a referral to a smoking cessation program.     4. Severe obesity (BMI >= 40)  Lifestyle and dietary interventions discussed, patient encouraged to maintain non-sedentary lifestyle and well-balanced diet.         Return to  clinic in 3 weeks for blood pressure check.  Follow up in about 6 months (around 11/28/2025).     Orders Placed This Encounter   Procedures    Protein/Creatinine Ratio, Urine     Standing Status:   Future     Expected Date:   11/1/2025     Expiration Date:   12/15/2025     Specimen Source:   Urine    Comprehensive Metabolic Panel     Standing Status:   Future     Expected Date:   11/1/2025     Expiration Date:   12/15/2025    Urinalysis, Reflex to Urine Culture     Standing Status:   Future     Expected Date:   11/1/2025     Expiration Date:   12/15/2025     Specimen Source:   Urine        BOUBACAR Belcher  [facility]

## 2025-05-28 ENCOUNTER — OFFICE VISIT (OUTPATIENT)
Dept: INTERNAL MEDICINE | Facility: CLINIC | Age: 45
End: 2025-05-28
Payer: MEDICAID

## 2025-05-28 ENCOUNTER — OFFICE VISIT (OUTPATIENT)
Dept: NEPHROLOGY | Facility: CLINIC | Age: 45
End: 2025-05-28
Payer: MEDICAID

## 2025-05-28 VITALS
RESPIRATION RATE: 18 BRPM | SYSTOLIC BLOOD PRESSURE: 176 MMHG | BODY MASS INDEX: 42.66 KG/M2 | WEIGHT: 315 LBS | DIASTOLIC BLOOD PRESSURE: 110 MMHG | HEART RATE: 93 BPM | HEIGHT: 72 IN | TEMPERATURE: 98 F | OXYGEN SATURATION: 98 %

## 2025-05-28 DIAGNOSIS — R73.03 PREDIABETES: ICD-10-CM

## 2025-05-28 DIAGNOSIS — E55.9 VITAMIN D DEFICIENCY: Primary | ICD-10-CM

## 2025-05-28 DIAGNOSIS — I10 PRIMARY HYPERTENSION: ICD-10-CM

## 2025-05-28 DIAGNOSIS — F17.210 CIGARETTE NICOTINE DEPENDENCE WITHOUT COMPLICATION: ICD-10-CM

## 2025-05-28 DIAGNOSIS — Z11.3 SCREEN FOR STD (SEXUALLY TRANSMITTED DISEASE): ICD-10-CM

## 2025-05-28 DIAGNOSIS — N18.2 CKD STAGE G2/A2, GFR 60-89 AND ALBUMIN CREATININE RATIO 30-299 MG/G: Primary | ICD-10-CM

## 2025-05-28 DIAGNOSIS — E66.01 SEVERE OBESITY (BMI >= 40): ICD-10-CM

## 2025-05-28 DIAGNOSIS — I10 HYPERTENSION, UNSPECIFIED TYPE: ICD-10-CM

## 2025-05-28 DIAGNOSIS — Z72.0 TOBACCO ABUSE: ICD-10-CM

## 2025-05-28 DIAGNOSIS — D64.9 ANEMIA, UNSPECIFIED TYPE: ICD-10-CM

## 2025-05-28 PROCEDURE — 3080F DIAST BP >= 90 MM HG: CPT | Mod: CPTII,,, | Performed by: NURSE PRACTITIONER

## 2025-05-28 PROCEDURE — 4010F ACE/ARB THERAPY RXD/TAKEN: CPT | Mod: CPTII,,, | Performed by: NURSE PRACTITIONER

## 2025-05-28 PROCEDURE — 1159F MED LIST DOCD IN RCRD: CPT | Mod: CPTII,,, | Performed by: NURSE PRACTITIONER

## 2025-05-28 PROCEDURE — 1159F MED LIST DOCD IN RCRD: CPT | Mod: CPTII,95,, | Performed by: NURSE PRACTITIONER

## 2025-05-28 PROCEDURE — 4010F ACE/ARB THERAPY RXD/TAKEN: CPT | Mod: CPTII,95,, | Performed by: NURSE PRACTITIONER

## 2025-05-28 PROCEDURE — 3066F NEPHROPATHY DOC TX: CPT | Mod: CPTII,95,, | Performed by: NURSE PRACTITIONER

## 2025-05-28 PROCEDURE — 3077F SYST BP >= 140 MM HG: CPT | Mod: CPTII,,, | Performed by: NURSE PRACTITIONER

## 2025-05-28 PROCEDURE — 3044F HG A1C LEVEL LT 7.0%: CPT | Mod: CPTII,,, | Performed by: NURSE PRACTITIONER

## 2025-05-28 PROCEDURE — 99406 BEHAV CHNG SMOKING 3-10 MIN: CPT | Mod: 25,95,, | Performed by: NURSE PRACTITIONER

## 2025-05-28 PROCEDURE — 99215 OFFICE O/P EST HI 40 MIN: CPT | Mod: PBBFAC | Performed by: NURSE PRACTITIONER

## 2025-05-28 PROCEDURE — 3044F HG A1C LEVEL LT 7.0%: CPT | Mod: CPTII,95,, | Performed by: NURSE PRACTITIONER

## 2025-05-28 PROCEDURE — 3008F BODY MASS INDEX DOCD: CPT | Mod: CPTII,,, | Performed by: NURSE PRACTITIONER

## 2025-05-28 PROCEDURE — 99214 OFFICE O/P EST MOD 30 MIN: CPT | Mod: S$PBB,,, | Performed by: NURSE PRACTITIONER

## 2025-05-28 PROCEDURE — 98007 SYNCH AUDIO-VIDEO EST HI 40: CPT | Mod: 95,,, | Performed by: NURSE PRACTITIONER

## 2025-05-28 PROCEDURE — 1160F RVW MEDS BY RX/DR IN RCRD: CPT | Mod: CPTII,95,, | Performed by: NURSE PRACTITIONER

## 2025-05-28 PROCEDURE — 3066F NEPHROPATHY DOC TX: CPT | Mod: CPTII,,, | Performed by: NURSE PRACTITIONER

## 2025-05-28 RX ORDER — FERROUS SULFATE 324(65)MG
324 TABLET, DELAYED RELEASE (ENTERIC COATED) ORAL EVERY OTHER DAY
Qty: 45 TABLET | Refills: 1 | Status: SHIPPED | OUTPATIENT
Start: 2025-05-28

## 2025-05-28 RX ORDER — LISINOPRIL 40 MG/1
40 TABLET ORAL DAILY
Qty: 90 TABLET | Refills: 3 | Status: SHIPPED | OUTPATIENT
Start: 2025-05-28 | End: 2026-05-28

## 2025-05-28 RX ORDER — VIT C/E/ZN/COPPR/LUTEIN/ZEAXAN 250MG-90MG
5000 CAPSULE ORAL DAILY
Qty: 90 CAPSULE | Refills: 1 | Status: SHIPPED | OUTPATIENT
Start: 2025-05-28

## 2025-05-28 RX ORDER — METOPROLOL SUCCINATE 50 MG/1
50 TABLET, EXTENDED RELEASE ORAL DAILY
Qty: 90 TABLET | Refills: 3 | Status: SHIPPED | OUTPATIENT
Start: 2025-05-28 | End: 2026-05-28

## 2025-05-28 RX ORDER — DOCUSATE SODIUM 100 MG/1
100 CAPSULE, LIQUID FILLED ORAL 2 TIMES DAILY PRN
Qty: 60 CAPSULE | Refills: 6 | Status: SHIPPED | OUTPATIENT
Start: 2025-05-28

## 2025-05-28 RX ORDER — AMLODIPINE BESYLATE 10 MG/1
10 TABLET ORAL DAILY
Qty: 90 TABLET | Refills: 1 | Status: SHIPPED | OUTPATIENT
Start: 2025-05-28 | End: 2026-05-28

## 2025-05-28 RX ORDER — MICONAZOLE NITRATE 2 %
2 CREAM (GRAM) TOPICAL
Qty: 50 EACH | Refills: 6 | Status: SHIPPED | OUTPATIENT
Start: 2025-05-28

## 2025-05-28 NOTE — PROGRESS NOTES
The patient location is: [place]  The chief complaint leading to consultation is: lab    Visit type: audiovisual    Face to Face time with patient: 40  40 minutes of total time spent on the encounter, which includes face to face time and non-face to face time preparing to see the patient (eg, review of tests), Obtaining and/or reviewing separately obtained history, Documenting clinical information in the electronic or other health record, Independently interpreting results (not separately reported) and communicating results to the patient/family/caregiver, or Care coordination (not separately reported).         Each patient to whom he or she provides medical services by telemedicine is:  (1) informed of the relationship between the physician and patient and the respective role of any other health care provider with respect to management of the patient; and (2) notified that he or she may decline to receive medical services by telemedicine and may withdraw from such care at any time.    Notes:               [facility]  BOUBACAR [facility]     Patient Name: Santiago Liriano   : 1980  MRN:[id number]     Chief Complaint     Chief Complaint   Patient presents with    Follow-up    Labs Only     6 month f/u         History of Present Illness     44 year old AAM, presents TM for lab f/u. Was seen by Nephrology in clinic today and BP was high. He takes amlodipine 10 daily and lisinopril 20 daily. Lisinopril was increased to 40 mg daily and metoprolol 50 daily was added; scheduled for BP check in 3 wks. No acute complaints voiced.      PMH HTN, CKD, prediabetes, Anemia, Schizophrenia , AR, obesity, tobacco use. Smokes 12 cigs/day (down to 4 cig/d) or 2 cigars per day. Stopped smoking 2023 but sinc restarted. BMI 42.  Follows NP Katerin Almeida Mental health provider every 3 months. Counselor is at [place] and meets every 2-3 weeks. On Invega, mood stable at this time. Lives alone, unemployed.  Sleeping well. Appetite fair. Counting calories at home, eats 3-6 small meals, and trying to exercise 4-8 miles per day by walking.  Denies chest pain, shortness of breath, cough, fever, headache, dizziness, weakness, abdominal pain, nausea, vomiting, diarrhea, constipation, dysuria, depression, anxiety.                      Review of Systems     Review of Systems   Constitutional:  Negative for activity change and unexpected weight change.   HENT:  Negative for hearing loss, rhinorrhea and trouble swallowing.    Eyes:  Negative for discharge and visual disturbance.   Respiratory:  Negative for chest tightness and wheezing.    Cardiovascular:  Negative for chest pain and palpitations.   Gastrointestinal:  Negative for blood in stool, constipation, diarrhea and vomiting.   Endocrine: Negative for polydipsia and polyuria.   Genitourinary:  Negative for difficulty urinating, hematuria and urgency.   Musculoskeletal:  Negative for arthralgias, joint swelling and neck pain.   Neurological:  Negative for weakness and headaches.   Psychiatric/Behavioral:  Negative for confusion and dysphoric mood.         Physical Examination     There were no vitals taken for this visit.     BP Readings from Last 6 Encounters:   05/28/25 (!) 176/110   01/27/25 (!) 156/96   07/24/24 126/72   07/22/24 119/78   03/20/24 138/82   02/21/24 (!) 132/90   ]    Wt Readings from Last 6 Encounters:   05/28/25 (!) 173 kg (381 lb 6.3 oz)   01/27/25 (!) 161.7 kg (356 lb 7.7 oz)   07/24/24 (!) 156 kg (343 lb 14.7 oz)   07/22/24 (!) 154.2 kg (340 lb)   03/20/24 (!) 153.8 kg (339 lb)   02/21/24 (!) 147.9 kg (326 lb)   ]    BMI Readings from Last 3 Encounters:   05/28/25 52.23 kg/m²   01/27/25 48.82 kg/m²   07/24/24 46.64 kg/m²         Physical Exam  Nursing note reviewed.   Neurological:      Mental Status: He is alert.   Psychiatric:         Mood and Affect: Mood normal.         Behavior: Behavior normal.          Labs / Imaging     Chemistry:  Lab  Results   Component Value Date     05/19/2025    K 4.0 05/19/2025    BUN 17.3 05/19/2025    CREATININE 1.39 (H) 05/19/2025    EGFRNORACEVR >60 05/19/2025    CALCIUM 8.8 05/19/2025    ALKPHOS 71 05/19/2025    ALBUMIN 3.9 05/19/2025    AST 19 05/19/2025    ALT 21 05/19/2025    [id number] 37 05/19/2025        Lab Results   Component Value Date    HGBA1C 6.2 05/19/2025        Hematology:  Lab Results   Component Value Date    WBC 4.40 (L) 05/19/2025    RBC 5.62 05/19/2025    HGB 12.7 (L) 05/19/2025    HCT 42.3 05/19/2025    MCV 75.3 (L) 05/19/2025    MCH 22.6 (L) 05/19/2025    MCHC 30.0 (L) 05/19/2025    RDW 16.5 05/19/2025     05/19/2025    MPV 10.7 (H) 05/19/2025        Lipid Panel:  Lab Results   Component Value Date    CHOL 167 05/19/2025    HDL 44 05/19/2025    .00 05/19/2025    TRIG 90 05/19/2025    TOTALCHOLEST 4 05/19/2025        Urine:  Lab Results   Component Value Date    APPEARANCEUA Clear 05/19/2025    SGUA 1.012 05/19/2025    PROTEINUA Negative 05/19/2025    KETONESUA Negative 05/19/2025    LEUKOCYTESUR Negative 05/19/2025    RBCUA 0-5 05/19/2025    WBCUA 0-5 05/19/2025    BACTERIA None Seen 05/19/2025    SQEPUA None Seen 05/19/2025    HYALINECASTS None Seen 05/19/2025    CREATRANDUR 225.1 (H) 01/27/2025    PROTEINURINE 22.9 01/27/2025    UPROTCREA 0.1 01/27/2025          Assessment       ICD-10-CM ICD-9-CM   1. Vitamin D deficiency  E55.9 268.9   2. Anemia, unspecified type  D64.9 285.9   3. Hypertension, unspecified type  I10 401.9   4. Cigarette nicotine dependence without complication  F17.210 305.1   5. Prediabetes  R73.03 790.29   6. Screen for STD (sexually transmitted disease)  Z11.3 V74.5        Plan     1. Anemia, unspecified type  Lab Results   Component Value Date    HCT 42.3 05/19/2025    HGB 12.7 (L) 05/19/2025     Take iron supplements as prescribed.  Add Vitamin C to your diet.  Take iron and vitamin C on an empty stomach for better absorption if tolerated.  Add iron  rich foods to diet such as liver, lean beef, eggs, dried fruit, dark green leafy vegetables.  Education provided on additional sources of iron-rich foods.  Do NOT drink milk or take antacids at the same time you take your iron supplement.  Iron supplements can cause constipation; add stool softener or fiber as needed.   - ferrous sulfate 324 mg (65 mg iron) TbEC; Take 1 tablet (324 mg total) by mouth every other day.  Dispense: 45 tablet; Refill: 1    2. Hypertension, unspecified type  Was seen by Nephrology in clinic today and BP was high. He takes amlodipine 10 daily and lisinopril 20 daily. Lisinopril was increased to 40 mg daily and metoprolol 50 daily was added; scheduled for BP check in 3 wks. No acute complaints voiced.     Med refills. DASH diet: Eat more fruits, vegetables, and low fat dairy foods.  (Less than 2 grams of sodium per day).  Maintain healthy weight with goal BMI <30.   Exercise 30 minutes per day 5 days per week.  Home medications refilled and continued.   Home BP monitoring encouraged with BP parameters given.    - amLODIPine (NORVASC) 10 MG tablet; Take 1 tablet (10 mg total) by mouth once daily.  Dispense: 90 tablet; Refill: 1    3. Cigarette nicotine dependence without complication  Smoking cessation advised. Instructed on smoking cessation program through [facility] and pharmacological interventions to aid in cessation.  >5 minutes allotted to educate patient on the harms of smoking, the urgency to quit, and the development of a plan for smoking cessation.     - nicotine, polacrilex, (NICORETTE) 2 mg Gum; Take 1 each (2 mg total) by mouth every 2 (two) hours as needed (smok cessation).  Dispense: 50 each; Refill: 6     Current Outpatient Medications   Medication Instructions    amLODIPine (NORVASC) 10 mg, Oral, Daily    cholecalciferol (vitamin D3) 5,000 Units, Oral, Daily    docusate sodium (COLACE) 100 mg, Oral, 2 times daily PRN    ferrous sulfate 324 mg, Oral, Every other day    INVEGA  SUSTENNA 156 mg/mL Syrg injection Inject into the muscle.    INVEGA TRINZA 546 mg, Every 3 months    lisinopriL (PRINIVIL,ZESTRIL) 40 mg, Oral, Daily    loratadine (CLARITIN) 10 mg, Oral, Daily    metoprolol succinate (TOPROL-XL) 50 mg, Oral, Daily    nicotine (polacrilex) (NICORETTE) 2 mg, Oral, Every 2 hours PRN       Orders Placed This Encounter   Procedures    Vitamin D    TSH    Hemoglobin A1C    Lipid Panel    Comprehensive Metabolic Panel    CBC Auto Differential    SYPHILIS ANTIBODY (WITH REFLEX RPR)    HIV 1/2 Ag/Ab (4th Gen)    Hepatitis Panel, Acute         Future Appointments   Date Time Provider Department Center   6/18/2025 11:00 AM NURSE, [facility] NEPHROLOGY [facility] [place]   11/28/2025 12:15 PM Consuelo Carlos FNP [facility] [place]        Follow up in about 6 months (around 11/28/2025) for fasting labs.    Labs thoroughly reviewed with patient. Medication refills addressed today.  RTC prn and 6 months, with labs 1 week prior to the apt.  COVID 19 precautions given to patient.  Patient voices understanding of all discharge instructions.      BOUBACAR [facility]

## 2025-06-18 ENCOUNTER — CLINICAL SUPPORT (OUTPATIENT)
Dept: NEPHROLOGY | Facility: CLINIC | Age: 45
End: 2025-06-18
Payer: MEDICAID

## 2025-06-18 VITALS — DIASTOLIC BLOOD PRESSURE: 73 MMHG | SYSTOLIC BLOOD PRESSURE: 124 MMHG

## 2025-06-18 DIAGNOSIS — I10 HYPERTENSION, UNSPECIFIED TYPE: Primary | ICD-10-CM

## 2025-06-18 PROCEDURE — 99212 OFFICE O/P EST SF 10 MIN: CPT | Mod: PBBFAC

## 2025-06-18 NOTE — PROGRESS NOTES
Presented to clinic for blood pressure check. Informed Consuelo HERRERA of reading, NP stated ok to keep current medication regime. Patient discharged from clinic, return to clinic as scheduled.

## 2025-07-13 DIAGNOSIS — I10 HYPERTENSION, UNSPECIFIED TYPE: ICD-10-CM

## 2025-07-14 RX ORDER — AMLODIPINE BESYLATE 10 MG/1
10 TABLET ORAL
Qty: 90 TABLET | Refills: 1 | OUTPATIENT
Start: 2025-07-14

## 2025-07-14 NOTE — TELEPHONE ENCOUNTER
Rx refill request too soon           Outpatient Medication Detail     Disp Refills Start End OSMIN   amLODIPine (NORVASC) 10 MG tablet 90 tablet 1 5/28/2025 5/28/2026 No   Sig - Route: Take 1 tablet (10 mg total) by mouth once daily. - Oral   Sent to pharmacy as: amLODIPine (NORVASC) 10 MG tablet   Class: Normal   Notes to Pharmacy: .   Order: [id number]   Date/Time Signed: 5/28/2025 15:22       E-Prescribing Status: Receipt confirmed by pharmacy (5/28/2025  3:22 PM CDT)

## 2025-07-15 ENCOUNTER — PATIENT MESSAGE (OUTPATIENT)
Facility: CLINIC | Age: 45
End: 2025-07-15
Payer: MEDICAID